# Patient Record
Sex: FEMALE | Race: WHITE | NOT HISPANIC OR LATINO | URBAN - METROPOLITAN AREA
[De-identification: names, ages, dates, MRNs, and addresses within clinical notes are randomized per-mention and may not be internally consistent; named-entity substitution may affect disease eponyms.]

---

## 2017-11-30 ENCOUNTER — INPATIENT (INPATIENT)
Facility: HOSPITAL | Age: 42
LOS: 2 days | Discharge: HOME | End: 2017-12-03
Attending: OBSTETRICS & GYNECOLOGY | Admitting: OBSTETRICS & GYNECOLOGY
Payer: COMMERCIAL

## 2017-11-30 ENCOUNTER — TRANSCRIPTION ENCOUNTER (OUTPATIENT)
Age: 42
End: 2017-11-30

## 2017-11-30 ENCOUNTER — RESULT REVIEW (OUTPATIENT)
Age: 42
End: 2017-11-30

## 2017-11-30 VITALS — WEIGHT: 147.71 LBS | HEIGHT: 58 IN

## 2017-11-30 DIAGNOSIS — Z3A.00 WEEKS OF GESTATION OF PREGNANCY NOT SPECIFIED: ICD-10-CM

## 2017-11-30 DIAGNOSIS — O26.899 OTHER SPECIFIED PREGNANCY RELATED CONDITIONS, UNSPECIFIED TRIMESTER: ICD-10-CM

## 2017-11-30 DIAGNOSIS — Z34.80 ENCOUNTER FOR SUPERVISION OF OTHER NORMAL PREGNANCY, UNSPECIFIED TRIMESTER: ICD-10-CM

## 2017-11-30 LAB
BASOPHILS # BLD AUTO: 0 K/UL — SIGNIFICANT CHANGE UP (ref 0–0.2)
BASOPHILS NFR BLD AUTO: 0.3 % — SIGNIFICANT CHANGE UP (ref 0–2)
BLD GP AB SCN SERPL QL: NEGATIVE — SIGNIFICANT CHANGE UP
EOSINOPHIL # BLD AUTO: 0 K/UL — SIGNIFICANT CHANGE UP (ref 0–0.5)
EOSINOPHIL NFR BLD AUTO: 0.4 % — SIGNIFICANT CHANGE UP (ref 0–6)
HCT VFR BLD CALC: 35.4 % — SIGNIFICANT CHANGE UP (ref 34.5–45)
HGB BLD-MCNC: 12.4 G/DL — SIGNIFICANT CHANGE UP (ref 11.5–15.5)
LYMPHOCYTES # BLD AUTO: 1.6 K/UL — SIGNIFICANT CHANGE UP (ref 1–3.3)
LYMPHOCYTES # BLD AUTO: 16.9 % — SIGNIFICANT CHANGE UP (ref 13–44)
MCHC RBC-ENTMCNC: 30.1 PG — SIGNIFICANT CHANGE UP (ref 27–34)
MCHC RBC-ENTMCNC: 34.9 GM/DL — SIGNIFICANT CHANGE UP (ref 32–36)
MCV RBC AUTO: 86.1 FL — SIGNIFICANT CHANGE UP (ref 80–100)
MONOCYTES # BLD AUTO: 0.6 K/UL — SIGNIFICANT CHANGE UP (ref 0–0.9)
MONOCYTES NFR BLD AUTO: 6.1 % — SIGNIFICANT CHANGE UP (ref 2–14)
NEUTROPHILS # BLD AUTO: 7.4 K/UL — SIGNIFICANT CHANGE UP (ref 1.8–7.4)
NEUTROPHILS NFR BLD AUTO: 76.3 % — SIGNIFICANT CHANGE UP (ref 43–77)
PLATELET # BLD AUTO: 161 K/UL — SIGNIFICANT CHANGE UP (ref 150–400)
RBC # BLD: 4.11 M/UL — SIGNIFICANT CHANGE UP (ref 3.8–5.2)
RBC # FLD: 12.3 % — SIGNIFICANT CHANGE UP (ref 10.3–14.5)
RH IG SCN BLD-IMP: POSITIVE — SIGNIFICANT CHANGE UP
WBC # BLD: 9.7 K/UL — SIGNIFICANT CHANGE UP (ref 3.8–10.5)
WBC # FLD AUTO: 9.7 K/UL — SIGNIFICANT CHANGE UP (ref 3.8–10.5)

## 2017-11-30 PROCEDURE — 88302 TISSUE EXAM BY PATHOLOGIST: CPT | Mod: 26

## 2017-11-30 RX ORDER — NALOXONE HYDROCHLORIDE 4 MG/.1ML
0.1 SPRAY NASAL
Qty: 0 | Refills: 0 | Status: DISCONTINUED | OUTPATIENT
Start: 2017-11-30 | End: 2017-12-03

## 2017-11-30 RX ORDER — ONDANSETRON 8 MG/1
4 TABLET, FILM COATED ORAL EVERY 6 HOURS
Qty: 0 | Refills: 0 | Status: DISCONTINUED | OUTPATIENT
Start: 2017-11-30 | End: 2017-12-03

## 2017-11-30 RX ORDER — OXYTOCIN 10 UNIT/ML
333.33 VIAL (ML) INJECTION
Qty: 20 | Refills: 0 | Status: COMPLETED | OUTPATIENT
Start: 2017-11-30 | End: 2017-11-30

## 2017-11-30 RX ORDER — LANOLIN
1 OINTMENT (GRAM) TOPICAL
Qty: 0 | Refills: 0 | Status: DISCONTINUED | OUTPATIENT
Start: 2017-12-01 | End: 2017-12-03

## 2017-11-30 RX ORDER — SIMETHICONE 80 MG/1
80 TABLET, CHEWABLE ORAL EVERY 4 HOURS
Qty: 0 | Refills: 0 | Status: DISCONTINUED | OUTPATIENT
Start: 2017-12-01 | End: 2017-12-03

## 2017-11-30 RX ORDER — SODIUM CHLORIDE 9 MG/ML
1000 INJECTION, SOLUTION INTRAVENOUS
Qty: 0 | Refills: 0 | Status: DISCONTINUED | OUTPATIENT
Start: 2017-11-30 | End: 2017-12-03

## 2017-11-30 RX ORDER — OXYTOCIN 10 UNIT/ML
41.67 VIAL (ML) INJECTION
Qty: 20 | Refills: 0 | Status: DISCONTINUED | OUTPATIENT
Start: 2017-12-01 | End: 2017-12-03

## 2017-11-30 RX ORDER — HEPARIN SODIUM 5000 [USP'U]/ML
5000 INJECTION INTRAVENOUS; SUBCUTANEOUS EVERY 12 HOURS
Qty: 0 | Refills: 0 | Status: DISCONTINUED | OUTPATIENT
Start: 2017-12-01 | End: 2017-12-03

## 2017-11-30 RX ORDER — ACETAMINOPHEN 500 MG
975 TABLET ORAL EVERY 6 HOURS
Qty: 0 | Refills: 0 | Status: DISCONTINUED | OUTPATIENT
Start: 2017-12-01 | End: 2017-12-03

## 2017-11-30 RX ORDER — SODIUM CHLORIDE 9 MG/ML
1000 INJECTION, SOLUTION INTRAVENOUS
Qty: 0 | Refills: 0 | Status: DISCONTINUED | OUTPATIENT
Start: 2017-11-30 | End: 2017-11-30

## 2017-11-30 RX ORDER — METOCLOPRAMIDE HCL 10 MG
10 TABLET ORAL ONCE
Qty: 0 | Refills: 0 | Status: DISCONTINUED | OUTPATIENT
Start: 2017-11-30 | End: 2017-12-03

## 2017-11-30 RX ORDER — OXYTOCIN 10 UNIT/ML
41.67 VIAL (ML) INJECTION
Qty: 20 | Refills: 0 | Status: DISCONTINUED | OUTPATIENT
Start: 2017-11-30 | End: 2017-12-03

## 2017-11-30 RX ORDER — SODIUM CHLORIDE 9 MG/ML
1000 INJECTION, SOLUTION INTRAVENOUS ONCE
Qty: 0 | Refills: 0 | Status: COMPLETED | OUTPATIENT
Start: 2017-11-30 | End: 2017-11-30

## 2017-11-30 RX ORDER — IBUPROFEN 200 MG
600 TABLET ORAL EVERY 6 HOURS
Qty: 0 | Refills: 0 | Status: COMPLETED | OUTPATIENT
Start: 2017-12-01 | End: 2018-10-30

## 2017-11-30 RX ORDER — FAMOTIDINE 10 MG/ML
20 INJECTION INTRAVENOUS ONCE
Qty: 0 | Refills: 0 | Status: COMPLETED | OUTPATIENT
Start: 2017-11-30 | End: 2017-11-30

## 2017-11-30 RX ORDER — DOCUSATE SODIUM 100 MG
100 CAPSULE ORAL
Qty: 0 | Refills: 0 | Status: DISCONTINUED | OUTPATIENT
Start: 2017-12-01 | End: 2017-12-03

## 2017-11-30 RX ORDER — TETANUS TOXOID, REDUCED DIPHTHERIA TOXOID AND ACELLULAR PERTUSSIS VACCINE, ADSORBED 5; 2.5; 8; 8; 2.5 [IU]/.5ML; [IU]/.5ML; UG/.5ML; UG/.5ML; UG/.5ML
0.5 SUSPENSION INTRAMUSCULAR ONCE
Qty: 0 | Refills: 0 | Status: DISCONTINUED | OUTPATIENT
Start: 2017-12-01 | End: 2017-12-03

## 2017-11-30 RX ORDER — DIPHENHYDRAMINE HCL 50 MG
25 CAPSULE ORAL EVERY 6 HOURS
Qty: 0 | Refills: 0 | Status: DISCONTINUED | OUTPATIENT
Start: 2017-12-01 | End: 2017-12-03

## 2017-11-30 RX ORDER — FERROUS SULFATE 325(65) MG
325 TABLET ORAL DAILY
Qty: 0 | Refills: 0 | Status: DISCONTINUED | OUTPATIENT
Start: 2017-12-01 | End: 2017-12-03

## 2017-11-30 RX ORDER — DEXAMETHASONE 0.5 MG/5ML
4 ELIXIR ORAL EVERY 6 HOURS
Qty: 0 | Refills: 0 | Status: DISCONTINUED | OUTPATIENT
Start: 2017-11-30 | End: 2017-12-03

## 2017-11-30 RX ORDER — CITRIC ACID/SODIUM CITRATE 300-500 MG
30 SOLUTION, ORAL ORAL ONCE
Qty: 0 | Refills: 0 | Status: COMPLETED | OUTPATIENT
Start: 2017-11-30 | End: 2017-11-30

## 2017-11-30 RX ORDER — GLYCERIN ADULT
1 SUPPOSITORY, RECTAL RECTAL AT BEDTIME
Qty: 0 | Refills: 0 | Status: DISCONTINUED | OUTPATIENT
Start: 2017-12-01 | End: 2017-12-03

## 2017-11-30 RX ORDER — OXYCODONE HYDROCHLORIDE 5 MG/1
5 TABLET ORAL
Qty: 0 | Refills: 0 | Status: COMPLETED | OUTPATIENT
Start: 2017-12-01 | End: 2017-12-08

## 2017-11-30 RX ORDER — KETOROLAC TROMETHAMINE 30 MG/ML
30 SYRINGE (ML) INJECTION EVERY 6 HOURS
Qty: 0 | Refills: 0 | Status: DISCONTINUED | OUTPATIENT
Start: 2017-12-01 | End: 2017-12-03

## 2017-11-30 RX ORDER — OXYCODONE HYDROCHLORIDE 5 MG/1
5 TABLET ORAL EVERY 4 HOURS
Qty: 0 | Refills: 0 | Status: COMPLETED | OUTPATIENT
Start: 2017-12-02 | End: 2017-12-09

## 2017-11-30 RX ADMIN — Medication 30 MILLILITER(S): at 19:10

## 2017-11-30 RX ADMIN — Medication 1000 MILLIUNIT(S)/MIN: at 21:24

## 2017-11-30 RX ADMIN — Medication 125 MILLIUNIT(S)/MIN: at 23:20

## 2017-11-30 RX ADMIN — SODIUM CHLORIDE 125 MILLILITER(S): 9 INJECTION, SOLUTION INTRAVENOUS at 17:20

## 2017-11-30 RX ADMIN — SODIUM CHLORIDE 2000 MILLILITER(S): 9 INJECTION, SOLUTION INTRAVENOUS at 16:40

## 2017-11-30 RX ADMIN — FAMOTIDINE 20 MILLIGRAM(S): 10 INJECTION INTRAVENOUS at 19:10

## 2017-11-30 NOTE — PATIENT PROFILE OB - TOBACCO USE
Problem: Communication  Goal: The ability to communicate needs accurately and effectively will improve  Outcome: PROGRESSING AS EXPECTED         Never smoker

## 2017-11-30 NOTE — PATIENT PROFILE OB - NS PRO REASONS FOR NOT BREASTFEEDING
The mother chose not to exclusively breastfeed upon admission./Maternal medical condition,  will not be

## 2017-12-01 LAB
HCT VFR BLD CALC: 30.9 % — LOW (ref 34.5–45)
HGB BLD-MCNC: 10.9 G/DL — LOW (ref 11.5–15.5)
MCHC RBC-ENTMCNC: 30.5 PG — SIGNIFICANT CHANGE UP (ref 27–34)
MCHC RBC-ENTMCNC: 35.3 GM/DL — SIGNIFICANT CHANGE UP (ref 32–36)
MCV RBC AUTO: 86.4 FL — SIGNIFICANT CHANGE UP (ref 80–100)
PLATELET # BLD AUTO: 154 K/UL — SIGNIFICANT CHANGE UP (ref 150–400)
RBC # BLD: 3.58 M/UL — LOW (ref 3.8–5.2)
RBC # FLD: 12.2 % — SIGNIFICANT CHANGE UP (ref 10.3–14.5)
T PALLIDUM AB TITR SER: NEGATIVE — SIGNIFICANT CHANGE UP
WBC # BLD: 10.6 K/UL — HIGH (ref 3.8–10.5)
WBC # FLD AUTO: 10.6 K/UL — HIGH (ref 3.8–10.5)

## 2017-12-01 RX ORDER — SERTRALINE 25 MG/1
100 TABLET, FILM COATED ORAL DAILY
Qty: 0 | Refills: 0 | Status: DISCONTINUED | OUTPATIENT
Start: 2017-12-01 | End: 2017-12-03

## 2017-12-01 RX ADMIN — SIMETHICONE 80 MILLIGRAM(S): 80 TABLET, CHEWABLE ORAL at 20:40

## 2017-12-01 RX ADMIN — Medication 30 MILLIGRAM(S): at 06:00

## 2017-12-01 RX ADMIN — Medication 975 MILLIGRAM(S): at 05:30

## 2017-12-01 RX ADMIN — Medication 1 TABLET(S): at 11:21

## 2017-12-01 RX ADMIN — SIMETHICONE 80 MILLIGRAM(S): 80 TABLET, CHEWABLE ORAL at 14:06

## 2017-12-01 RX ADMIN — Medication 325 MILLIGRAM(S): at 11:22

## 2017-12-01 RX ADMIN — Medication 30 MILLIGRAM(S): at 12:00

## 2017-12-01 RX ADMIN — HEPARIN SODIUM 5000 UNIT(S): 5000 INJECTION INTRAVENOUS; SUBCUTANEOUS at 02:13

## 2017-12-01 RX ADMIN — HEPARIN SODIUM 5000 UNIT(S): 5000 INJECTION INTRAVENOUS; SUBCUTANEOUS at 17:15

## 2017-12-01 RX ADMIN — Medication 30 MILLIGRAM(S): at 17:14

## 2017-12-01 RX ADMIN — Medication 30 MILLIGRAM(S): at 11:21

## 2017-12-01 RX ADMIN — Medication 25 MILLIGRAM(S): at 08:25

## 2017-12-01 RX ADMIN — SIMETHICONE 80 MILLIGRAM(S): 80 TABLET, CHEWABLE ORAL at 05:31

## 2017-12-01 RX ADMIN — Medication 100 MILLIGRAM(S): at 05:31

## 2017-12-01 RX ADMIN — SERTRALINE 100 MILLIGRAM(S): 25 TABLET, FILM COATED ORAL at 06:53

## 2017-12-01 RX ADMIN — Medication 30 MILLIGRAM(S): at 17:49

## 2017-12-01 RX ADMIN — Medication 25 MILLIGRAM(S): at 02:13

## 2017-12-01 RX ADMIN — Medication 30 MILLIGRAM(S): at 05:31

## 2017-12-01 RX ADMIN — SODIUM CHLORIDE 125 MILLILITER(S): 9 INJECTION, SOLUTION INTRAVENOUS at 13:53

## 2017-12-01 RX ADMIN — Medication 975 MILLIGRAM(S): at 22:30

## 2017-12-01 RX ADMIN — Medication 975 MILLIGRAM(S): at 23:00

## 2017-12-01 RX ADMIN — Medication 975 MILLIGRAM(S): at 06:00

## 2017-12-01 NOTE — PROGRESS NOTE ADULT - SUBJECTIVE AND OBJECTIVE BOX
Postpartum Note,  Section  She is a  42y woman who is now post-operative day: 1    Subjective:  The patient feels well.  She is ambulating.   She is tolerating water overnight.  She denies nausea and vomiting.  She is voiding.  Her pain is controlled.  She reports normal postpartum bleeding.    Physical exam:    Vital Signs Last 24 Hrs  T(C): 36.7 (01 Dec 2017 05:00), Max: 36.8 (2017 23:10)  T(F): 98.1 (01 Dec 2017 05:00), Max: 98.2 (2017 23:10)  HR: 51 (01 Dec 2017 05:00) (51 - 76)  BP: 128/75 (01 Dec 2017 05:00) (114/66 - 150/72)  BP(mean): 101 (2017 23:10) (74 - 104)  RR: 18 (01 Dec 2017 05:00) (15 - 18)  SpO2: 99% (01 Dec 2017 05:00) (96% - 100%)    Gen: NAD  Breast: Soft, nontender, not engorged.  Abdomen: Soft, nontender, no distension , firm uterine fundus at umbilicus.  Incision: Dressing removed - clean, dry, and intact with steri strips  Ext: No calf tenderness    LABS:                        12.4   9.7   )-----------( 161      ( 2017 16:27 )             35.4     ABO Interpretation: B ( @ 16:27)  Rh Interpretation: Positive ( @ 16:27)  Antibody Screen: Negative ( @ 16:27)        Allergies    No Known Allergies    Intolerances      MEDICATIONS  (STANDING):  acetaminophen   Tablet. 975 milliGRAM(s) Oral every 6 hours  diphtheria/tetanus/pertussis (acellular) Vaccine (ADAcel) 0.5 milliLiter(s) IntraMuscular once  fentaNYL (3 MICROgram(s)/mL) + BUpivacaine 0.01% in 0.9% Sodium Chloride PCEA 250 milliLiter(s) Epidural PCA Continuous  ferrous    sulfate 325 milliGRAM(s) Oral daily  heparin  Injectable 5000 Unit(s) SubCutaneous every 12 hours  ibuprofen  Tablet 600 milliGRAM(s) Oral every 6 hours  ketorolac   Injectable 30 milliGRAM(s) IV Push every 6 hours  lactated ringers. 1000 milliLiter(s) (125 mL/Hr) IV Continuous <Continuous>  oxyCODONE    IR 5 milliGRAM(s) Oral every 3 hours  oxytocin Infusion 41.667 milliUNIT(s)/Min (125 mL/Hr) IV Continuous <Continuous>  oxytocin Infusion 41.667 milliUNIT(s)/Min (125 mL/Hr) IV Continuous <Continuous>  prenatal multivitamin 1 Tablet(s) Oral daily  sertraline 100 milliGRAM(s) Oral daily    MEDICATIONS  (PRN):  dexamethasone  Injectable 4 milliGRAM(s) IV Push every 6 hours PRN Nausea, IF ondansetron is ineffective after 30 - 60 minutes  diphenhydrAMINE   Capsule 25 milliGRAM(s) Oral every 6 hours PRN Itching  docusate sodium 100 milliGRAM(s) Oral two times a day PRN Stool Softening  fentaNYL (3 MICROgram(s)/mL) + BUpivacaine 0.01% in 0.9% Sodium Chloride PCEA Rescue Clinician Bolus 5 milliLiter(s) Epidural every 15 minutes PRN Severe Pain (7 - 10)  glycerin Suppository - Adult 1 Suppository(s) Rectal at bedtime PRN Constipation  lanolin Ointment 1 Application(s) Topical every 3 hours PRN Sore Nipples  metoclopramide Injectable 10 milliGRAM(s) IV Push once PRN Nausea and/or Vomiting  naloxone Injectable 0.1 milliGRAM(s) IV Push every 3 minutes PRN For ANY of the following changes in patient status:  A. RR LESS THAN 10 breaths per minute, B. Oxygen saturation LESS THAN 90%, C. Sedation score of 6  ondansetron Injectable 4 milliGRAM(s) IV Push every 6 hours PRN Nausea  simethicone 80 milliGRAM(s) Chew every 4 hours PRN Gas        Assessment and Plan  POD #1 s/p  section  Continue current pain medication.   Encourage ambulation.  DVT ppx: HSQ, venodynes, OOB  Pt stable, tolerating regular diet, venegas in place w/adequate UOP.    michael [email or web address] Postpartum Note,  Section  She is a  42y woman who is now post-operative day: 1    Subjective:  The patient feels well.  She is ambulating.   She is tolerating water overnight.  She denies nausea and vomiting.  Her pain is controlled.  She reports normal postpartum bleeding.    Physical exam:    Vital Signs Last 24 Hrs  T(C): 36.7 (01 Dec 2017 05:00), Max: 36.8 (2017 23:10)  T(F): 98.1 (01 Dec 2017 05:00), Max: 98.2 (2017 23:10)  HR: 51 (01 Dec 2017 05:00) (51 - 76)  BP: 128/75 (01 Dec 2017 05:00) (114/66 - 150/72)  BP(mean): 101 (2017 23:10) (74 - 104)  RR: 18 (01 Dec 2017 05:00) (15 - 18)  SpO2: 99% (01 Dec 2017 05:00) (96% - 100%)    Gen: NAD  Breast: Soft, nontender, not engorged.  Abdomen: Soft, nontender, no distension , firm uterine fundus at umbilicus.  Incision: Dressing removed - clean, dry, and intact with steri strips  Ext: No calf tenderness    LABS:                        12.4   9.7   )-----------( 161      ( 2017 16:27 )             35.4     ABO Interpretation: B (30 @ 16:27)  Rh Interpretation: Positive ( @ 16:27)  Antibody Screen: Negative ( @ 16:27)        Allergies    No Known Allergies    Intolerances      MEDICATIONS  (STANDING):  acetaminophen   Tablet. 975 milliGRAM(s) Oral every 6 hours  diphtheria/tetanus/pertussis (acellular) Vaccine (ADAcel) 0.5 milliLiter(s) IntraMuscular once  fentaNYL (3 MICROgram(s)/mL) + BUpivacaine 0.01% in 0.9% Sodium Chloride PCEA 250 milliLiter(s) Epidural PCA Continuous  ferrous    sulfate 325 milliGRAM(s) Oral daily  heparin  Injectable 5000 Unit(s) SubCutaneous every 12 hours  ibuprofen  Tablet 600 milliGRAM(s) Oral every 6 hours  ketorolac   Injectable 30 milliGRAM(s) IV Push every 6 hours  lactated ringers. 1000 milliLiter(s) (125 mL/Hr) IV Continuous <Continuous>  oxyCODONE    IR 5 milliGRAM(s) Oral every 3 hours  oxytocin Infusion 41.667 milliUNIT(s)/Min (125 mL/Hr) IV Continuous <Continuous>  oxytocin Infusion 41.667 milliUNIT(s)/Min (125 mL/Hr) IV Continuous <Continuous>  prenatal multivitamin 1 Tablet(s) Oral daily  sertraline 100 milliGRAM(s) Oral daily    MEDICATIONS  (PRN):  dexamethasone  Injectable 4 milliGRAM(s) IV Push every 6 hours PRN Nausea, IF ondansetron is ineffective after 30 - 60 minutes  diphenhydrAMINE   Capsule 25 milliGRAM(s) Oral every 6 hours PRN Itching  docusate sodium 100 milliGRAM(s) Oral two times a day PRN Stool Softening  fentaNYL (3 MICROgram(s)/mL) + BUpivacaine 0.01% in 0.9% Sodium Chloride PCEA Rescue Clinician Bolus 5 milliLiter(s) Epidural every 15 minutes PRN Severe Pain (7 - 10)  glycerin Suppository - Adult 1 Suppository(s) Rectal at bedtime PRN Constipation  lanolin Ointment 1 Application(s) Topical every 3 hours PRN Sore Nipples  metoclopramide Injectable 10 milliGRAM(s) IV Push once PRN Nausea and/or Vomiting  naloxone Injectable 0.1 milliGRAM(s) IV Push every 3 minutes PRN For ANY of the following changes in patient status:  A. RR LESS THAN 10 breaths per minute, B. Oxygen saturation LESS THAN 90%, C. Sedation score of 6  ondansetron Injectable 4 milliGRAM(s) IV Push every 6 hours PRN Nausea  simethicone 80 milliGRAM(s) Chew every 4 hours PRN Gas        Assessment and Plan  POD #1 s/p  section  Continue current pain medication.   Encourage ambulation.  DVT ppx: HSQ, venodynes, OOB  Pt stable, tolerating regular diet, venegas in place w/adequate UOP.    Renya, [email or web address]

## 2017-12-01 NOTE — PROVIDER CONTACT NOTE (OTHER) - SITUATION
Rec pt from L&D at 00:00 - pt pitcon was being bolus and bp was 147/85. - rec report from L&D nurse Lorie

## 2017-12-02 ENCOUNTER — TRANSCRIPTION ENCOUNTER (OUTPATIENT)
Age: 42
End: 2017-12-02

## 2017-12-02 RX ORDER — IBUPROFEN 200 MG
600 TABLET ORAL EVERY 6 HOURS
Qty: 0 | Refills: 0 | Status: DISCONTINUED | OUTPATIENT
Start: 2017-12-02 | End: 2017-12-03

## 2017-12-02 RX ORDER — FAMOTIDINE 10 MG/ML
20 INJECTION INTRAVENOUS DAILY
Qty: 0 | Refills: 0 | Status: DISCONTINUED | OUTPATIENT
Start: 2017-12-02 | End: 2017-12-03

## 2017-12-02 RX ORDER — OXYCODONE HYDROCHLORIDE 5 MG/1
5 TABLET ORAL EVERY 4 HOURS
Qty: 0 | Refills: 0 | Status: DISCONTINUED | OUTPATIENT
Start: 2017-12-02 | End: 2017-12-03

## 2017-12-02 RX ORDER — OXYCODONE HYDROCHLORIDE 5 MG/1
5 TABLET ORAL
Qty: 0 | Refills: 0 | Status: DISCONTINUED | OUTPATIENT
Start: 2017-12-02 | End: 2017-12-03

## 2017-12-02 RX ADMIN — OXYCODONE HYDROCHLORIDE 5 MILLIGRAM(S): 5 TABLET ORAL at 08:34

## 2017-12-02 RX ADMIN — Medication 600 MILLIGRAM(S): at 01:40

## 2017-12-02 RX ADMIN — Medication 975 MILLIGRAM(S): at 18:33

## 2017-12-02 RX ADMIN — Medication 100 MILLIGRAM(S): at 04:54

## 2017-12-02 RX ADMIN — Medication 975 MILLIGRAM(S): at 19:05

## 2017-12-02 RX ADMIN — Medication 600 MILLIGRAM(S): at 13:28

## 2017-12-02 RX ADMIN — OXYCODONE HYDROCHLORIDE 5 MILLIGRAM(S): 5 TABLET ORAL at 08:04

## 2017-12-02 RX ADMIN — Medication 1 TABLET(S): at 13:21

## 2017-12-02 RX ADMIN — Medication 325 MILLIGRAM(S): at 13:20

## 2017-12-02 RX ADMIN — FAMOTIDINE 20 MILLIGRAM(S): 10 INJECTION INTRAVENOUS at 13:20

## 2017-12-02 RX ADMIN — Medication 600 MILLIGRAM(S): at 01:09

## 2017-12-02 RX ADMIN — OXYCODONE HYDROCHLORIDE 5 MILLIGRAM(S): 5 TABLET ORAL at 18:33

## 2017-12-02 RX ADMIN — Medication 30 MILLIGRAM(S): at 19:05

## 2017-12-02 RX ADMIN — Medication 600 MILLIGRAM(S): at 06:59

## 2017-12-02 RX ADMIN — HEPARIN SODIUM 5000 UNIT(S): 5000 INJECTION INTRAVENOUS; SUBCUTANEOUS at 06:59

## 2017-12-02 RX ADMIN — Medication 975 MILLIGRAM(S): at 04:54

## 2017-12-02 RX ADMIN — SIMETHICONE 80 MILLIGRAM(S): 80 TABLET, CHEWABLE ORAL at 08:05

## 2017-12-02 RX ADMIN — SERTRALINE 100 MILLIGRAM(S): 25 TABLET, FILM COATED ORAL at 06:59

## 2017-12-02 RX ADMIN — Medication 600 MILLIGRAM(S): at 13:48

## 2017-12-02 RX ADMIN — Medication 975 MILLIGRAM(S): at 13:18

## 2017-12-02 RX ADMIN — OXYCODONE HYDROCHLORIDE 5 MILLIGRAM(S): 5 TABLET ORAL at 13:48

## 2017-12-02 RX ADMIN — OXYCODONE HYDROCHLORIDE 5 MILLIGRAM(S): 5 TABLET ORAL at 19:05

## 2017-12-02 RX ADMIN — HEPARIN SODIUM 5000 UNIT(S): 5000 INJECTION INTRAVENOUS; SUBCUTANEOUS at 18:34

## 2017-12-02 RX ADMIN — Medication 30 MILLIGRAM(S): at 18:37

## 2017-12-02 RX ADMIN — OXYCODONE HYDROCHLORIDE 5 MILLIGRAM(S): 5 TABLET ORAL at 13:21

## 2017-12-02 RX ADMIN — Medication 975 MILLIGRAM(S): at 13:48

## 2017-12-02 RX ADMIN — Medication 975 MILLIGRAM(S): at 05:30

## 2017-12-02 NOTE — PROGRESS NOTE ADULT - SUBJECTIVE AND OBJECTIVE BOX
Patient seen and examined at bedside, no acute overnight events. No acute complaints, pain well controlled. Patient is ambulating, voiding spontaneously, passing flatus, and tolerating regular diet. Pt is breast feeding her baby.    Vital Signs Last 24 Hours  T(C): 36.5 (12-02-17 @ 06:54), Max: 36.8 (12-01-17 @ 13:00)  HR: 55 (12-02-17 @ 06:54) (52 - 86)  BP: 137/85 (12-02-17 @ 06:54) (107/68 - 137/85)  RR: 18 (12-02-17 @ 06:54) (17 - 18)  SpO2: 97% (12-02-17 @ 01:15) (97% - 100%)    Physical Exam:  General: NAD  Abdomen: Soft, non-tender, non-distended, fundus firm  Incision: Pfannenstiel incision CDI, subcuticular suture closure  Pelvic: Lochia wnl    Labs:    Blood Type: B Positive  Antibody Screen: Negative  RPR: Negative               10.9   10.6  )-----------( 154      ( 12-01 @ 07:12 )             30.9                12.4   9.7   )-----------( 161      ( 11-30 @ 16:27 )             35.4         MEDICATIONS  (STANDING):  acetaminophen   Tablet. 975 milliGRAM(s) Oral every 6 hours  diphtheria/tetanus/pertussis (acellular) Vaccine (ADAcel) 0.5 milliLiter(s) IntraMuscular once  fentaNYL (3 MICROgram(s)/mL) + BUpivacaine 0.01% in 0.9% Sodium Chloride PCEA 250 milliLiter(s) Epidural PCA Continuous  ferrous    sulfate 325 milliGRAM(s) Oral daily  heparin  Injectable 5000 Unit(s) SubCutaneous every 12 hours  ibuprofen  Tablet 600 milliGRAM(s) Oral every 6 hours  ketorolac   Injectable 30 milliGRAM(s) IV Push every 6 hours  lactated ringers. 1000 milliLiter(s) (125 mL/Hr) IV Continuous <Continuous>  oxyCODONE    IR 5 milliGRAM(s) Oral every 3 hours  oxytocin Infusion 41.667 milliUNIT(s)/Min (125 mL/Hr) IV Continuous <Continuous>  oxytocin Infusion 41.667 milliUNIT(s)/Min (125 mL/Hr) IV Continuous <Continuous>  prenatal multivitamin 1 Tablet(s) Oral daily  sertraline 100 milliGRAM(s) Oral daily    MEDICATIONS  (PRN):  dexamethasone  Injectable 4 milliGRAM(s) IV Push every 6 hours PRN Nausea, IF ondansetron is ineffective after 30 - 60 minutes  diphenhydrAMINE   Capsule 25 milliGRAM(s) Oral every 6 hours PRN Itching  docusate sodium 100 milliGRAM(s) Oral two times a day PRN Stool Softening  fentaNYL (3 MICROgram(s)/mL) + BUpivacaine 0.01% in 0.9% Sodium Chloride PCEA Rescue Clinician Bolus 5 milliLiter(s) Epidural every 15 minutes PRN Severe Pain (7 - 10)  glycerin Suppository - Adult 1 Suppository(s) Rectal at bedtime PRN Constipation  lanolin Ointment 1 Application(s) Topical every 3 hours PRN Sore Nipples  metoclopramide Injectable 10 milliGRAM(s) IV Push once PRN Nausea and/or Vomiting  naloxone Injectable 0.1 milliGRAM(s) IV Push every 3 minutes PRN For ANY of the following changes in patient status:  A. RR LESS THAN 10 breaths per minute, B. Oxygen saturation LESS THAN 90%, C. Sedation score of 6  ondansetron Injectable 4 milliGRAM(s) IV Push every 6 hours PRN Nausea  oxyCODONE    IR 5 milliGRAM(s) Oral every 4 hours PRN Severe Pain (7 - 10)  simethicone 80 milliGRAM(s) Chew every 4 hours PRN Gas

## 2017-12-02 NOTE — DISCHARGE NOTE OB - CARE PROVIDER_API CALL
Homero Lawton (MD), Obstetrics and Gynecology  [address] [place] [address] [address]  [place] [address]  Phone: [phone]  Fax: [phone]

## 2017-12-02 NOTE — DISCHARGE NOTE OB - MATERIALS PROVIDED
Immunization Record/[facility] Screening Program/Breastfeeding Log/Breastfeeding Guide and Packet/Guide to Postpartum Care/Discharge Medication Information for Patients and Families Pocket Guide

## 2017-12-02 NOTE — DISCHARGE NOTE OB - PATIENT PORTAL LINK FT
“You can access the Follow[employer] Patient Portal, offered by [facility], by registering with the following website: [email or web address]”

## 2017-12-02 NOTE — DISCHARGE NOTE OB - CARE PLAN
Principal Discharge DX:	 delivery delivered  Goal:	home  Instructions for follow-up, activity and diet:	per pmd

## 2017-12-02 NOTE — PROGRESS NOTE ADULT - SUBJECTIVE AND OBJECTIVE BOX
Day _1_ of Anesthesia Pain Management Service    SUBJECTIVE:  Pain Scale Score	At rest: ___ 	With Activity: ___ 	[x  ] Refer to charted pain scores    THERAPY:  [ ] Epidural Bupivacaine 0.0625% and Hydromorphone 10 micrograms/mL  [ ] Epidural Ropivacaine 0.2% plain   [x ] Epidural Bupivacaine 0.01 % and Fentanyl 3 micrograms/mL  (OB)    Demand dose _3ml__ lockout _15__ (minutes) Continuous Rate _10__       MEDICATIONS  (STANDING):  acetaminophen   Tablet. 975 milliGRAM(s) Oral every 6 hours  diphtheria/tetanus/pertussis (acellular) Vaccine (ADAcel) 0.5 milliLiter(s) IntraMuscular once  famotidine    Tablet 20 milliGRAM(s) Oral daily  fentaNYL (3 MICROgram(s)/mL) + BUpivacaine 0.01% in 0.9% Sodium Chloride PCEA 250 milliLiter(s) Epidural PCA Continuous  ferrous    sulfate 325 milliGRAM(s) Oral daily  heparin  Injectable 5000 Unit(s) SubCutaneous every 12 hours  ibuprofen  Tablet 600 milliGRAM(s) Oral every 6 hours  ketorolac   Injectable 30 milliGRAM(s) IV Push every 6 hours  lactated ringers. 1000 milliLiter(s) (125 mL/Hr) IV Continuous <Continuous>  oxyCODONE    IR 5 milliGRAM(s) Oral every 3 hours  oxytocin Infusion 41.667 milliUNIT(s)/Min (125 mL/Hr) IV Continuous <Continuous>  oxytocin Infusion 41.667 milliUNIT(s)/Min (125 mL/Hr) IV Continuous <Continuous>  prenatal multivitamin 1 Tablet(s) Oral daily  sertraline 100 milliGRAM(s) Oral daily    MEDICATIONS  (PRN):  dexamethasone  Injectable 4 milliGRAM(s) IV Push every 6 hours PRN Nausea, IF ondansetron is ineffective after 30 - 60 minutes  diphenhydrAMINE   Capsule 25 milliGRAM(s) Oral every 6 hours PRN Itching  docusate sodium 100 milliGRAM(s) Oral two times a day PRN Stool Softening  fentaNYL (3 MICROgram(s)/mL) + BUpivacaine 0.01% in 0.9% Sodium Chloride PCEA Rescue Clinician Bolus 5 milliLiter(s) Epidural every 15 minutes PRN Severe Pain (7 - 10)  glycerin Suppository - Adult 1 Suppository(s) Rectal at bedtime PRN Constipation  lanolin Ointment 1 Application(s) Topical every 3 hours PRN Sore Nipples  metoclopramide Injectable 10 milliGRAM(s) IV Push once PRN Nausea and/or Vomiting  naloxone Injectable 0.1 milliGRAM(s) IV Push every 3 minutes PRN For ANY of the following changes in patient status:  A. RR LESS THAN 10 breaths per minute, B. Oxygen saturation LESS THAN 90%, C. Sedation score of 6  ondansetron Injectable 4 milliGRAM(s) IV Push every 6 hours PRN Nausea  oxyCODONE    IR 5 milliGRAM(s) Oral every 4 hours PRN Severe Pain (7 - 10)  simethicone 80 milliGRAM(s) Chew every 4 hours PRN Gas      OBJECTIVE:    Assessment of Epidural Catheter Site: 	    [ ] Dressing intact	[ x] Site non-tender	[x ] Site without erythema, discharge, edema  [ ] Epidural tubing and connection checked	[ x[ Gross neurological exam within normal limits  [x ] Catheter removed – tip intact		                          10.9   10.6  )-----------( 154      ( 01 Dec 2017 07:12 )             30.9     Vital Signs Last 24 Hrs  T(C): 36.8 (12-02-17 @ 14:00), Max: 36.8 (12-01-17 @ 21:31)  T(F): 98.2 (12-02-17 @ 14:00), Max: 98.2 (12-01-17 @ 21:31)  HR: 63 (12-02-17 @ 14:00) (52 - 63)  BP: 132/82 (12-02-17 @ 14:00) (110/67 - 137/85)  BP(mean): --  RR: 18 (12-02-17 @ 14:00) (17 - 18)  SpO2: 97% (12-02-17 @ 01:15) (97% - 100%)      Sedation Score:	[x ] Alert	[ ] Drowsy	[ ] Arousable  [ ] Asleep  [ ] Unresponsive    Side Effects:	[ x] None	[ ] Nausea	[ ] Vomiting  [ ] Pruritus  		[ ] Weakness  [ ] Numbness  [ ] Other:    ASSESSMENT/ PLAN:    Therapy to  be:	[ ] Continue   [ x] Discontinued   [x ] Change to prn Analgesics    Documentation and Verification of current medications:  [ X ] Done	[ ] Not done, not eligible, reason:    Comments:I was physically present for the key portions of the evaluation and management service provided. I agree with the above history, physical, and plan which I ahve reviewed and edited where appropriate

## 2017-12-03 VITALS
DIASTOLIC BLOOD PRESSURE: 78 MMHG | RESPIRATION RATE: 18 BRPM | SYSTOLIC BLOOD PRESSURE: 131 MMHG | HEART RATE: 54 BPM | TEMPERATURE: 98 F

## 2017-12-03 LAB
HCT VFR BLD CALC: 33.2 % — LOW (ref 34.5–45)
HGB BLD-MCNC: 10.9 G/DL — LOW (ref 11.5–15.5)
MCHC RBC-ENTMCNC: 27.9 PG — SIGNIFICANT CHANGE UP (ref 27–34)
MCHC RBC-ENTMCNC: 32.8 GM/DL — SIGNIFICANT CHANGE UP (ref 32–36)
MCV RBC AUTO: 85.1 FL — SIGNIFICANT CHANGE UP (ref 80–100)
PLATELET # BLD AUTO: 192 K/UL — SIGNIFICANT CHANGE UP (ref 150–400)
RBC # BLD: 3.9 M/UL — SIGNIFICANT CHANGE UP (ref 3.8–5.2)
RBC # FLD: 13.9 % — SIGNIFICANT CHANGE UP (ref 10.3–14.5)
WBC # BLD: 7.95 K/UL — SIGNIFICANT CHANGE UP (ref 3.8–10.5)
WBC # FLD AUTO: 7.95 K/UL — SIGNIFICANT CHANGE UP (ref 3.8–10.5)

## 2017-12-03 PROCEDURE — 86901 BLOOD TYPING SEROLOGIC RH(D): CPT

## 2017-12-03 PROCEDURE — 86900 BLOOD TYPING SEROLOGIC ABO: CPT

## 2017-12-03 PROCEDURE — G0463: CPT

## 2017-12-03 PROCEDURE — 85027 COMPLETE CBC AUTOMATED: CPT

## 2017-12-03 PROCEDURE — 86850 RBC ANTIBODY SCREEN: CPT

## 2017-12-03 PROCEDURE — 88302 TISSUE EXAM BY PATHOLOGIST: CPT

## 2017-12-03 PROCEDURE — 59050 FETAL MONITOR W/REPORT: CPT

## 2017-12-03 PROCEDURE — 59025 FETAL NON-STRESS TEST: CPT

## 2017-12-03 PROCEDURE — 86780 TREPONEMA PALLIDUM: CPT

## 2017-12-03 RX ORDER — MAGNESIUM HYDROXIDE 400 MG/1
30 TABLET, CHEWABLE ORAL DAILY
Qty: 0 | Refills: 0 | Status: DISCONTINUED | OUTPATIENT
Start: 2017-12-03 | End: 2017-12-03

## 2017-12-03 RX ORDER — OXYCODONE HYDROCHLORIDE 5 MG/1
1 TABLET ORAL
Qty: 20 | Refills: 0 | OUTPATIENT
Start: 2017-12-03

## 2017-12-03 RX ADMIN — Medication 975 MILLIGRAM(S): at 07:05

## 2017-12-03 RX ADMIN — Medication 975 MILLIGRAM(S): at 06:35

## 2017-12-03 RX ADMIN — HEPARIN SODIUM 5000 UNIT(S): 5000 INJECTION INTRAVENOUS; SUBCUTANEOUS at 06:34

## 2017-12-03 RX ADMIN — Medication 975 MILLIGRAM(S): at 11:33

## 2017-12-03 RX ADMIN — Medication 600 MILLIGRAM(S): at 07:05

## 2017-12-03 RX ADMIN — SIMETHICONE 80 MILLIGRAM(S): 80 TABLET, CHEWABLE ORAL at 06:33

## 2017-12-03 RX ADMIN — Medication 325 MILLIGRAM(S): at 11:34

## 2017-12-03 RX ADMIN — MAGNESIUM HYDROXIDE 30 MILLILITER(S): 400 TABLET, CHEWABLE ORAL at 11:02

## 2017-12-03 RX ADMIN — Medication 600 MILLIGRAM(S): at 06:35

## 2017-12-03 RX ADMIN — Medication 975 MILLIGRAM(S): at 01:30

## 2017-12-03 RX ADMIN — Medication 100 MILLIGRAM(S): at 06:33

## 2017-12-03 RX ADMIN — Medication 1 TABLET(S): at 11:35

## 2017-12-03 RX ADMIN — Medication 600 MILLIGRAM(S): at 00:59

## 2017-12-03 RX ADMIN — Medication 600 MILLIGRAM(S): at 01:30

## 2017-12-03 RX ADMIN — Medication 600 MILLIGRAM(S): at 11:34

## 2017-12-03 RX ADMIN — Medication 975 MILLIGRAM(S): at 00:59

## 2017-12-03 RX ADMIN — OXYCODONE HYDROCHLORIDE 5 MILLIGRAM(S): 5 TABLET ORAL at 11:34

## 2017-12-03 RX ADMIN — SERTRALINE 100 MILLIGRAM(S): 25 TABLET, FILM COATED ORAL at 06:33

## 2017-12-03 NOTE — PROGRESS NOTE ADULT - PROBLEM SELECTOR PLAN 1
- Continue motrin, tylenol, oxycodone PRN for pain control.  - Increase ambulation  - Continue regular diet  - Discharge planning
- Continue with po analgesia  - Increase ambulation  - Continue regular diet  - IV lock  - No labs    ROMAINE Jefferson [email or web address]

## 2017-12-03 NOTE — PROGRESS NOTE ADULT - SUBJECTIVE AND OBJECTIVE BOX
S: Patient doing well. No complaints. Minimal lochia. Pain controlled.    O: Vital Signs Last 24 Hrs  T(C): 36.6 (03 Dec 2017 06:15), Max: 36.8 (02 Dec 2017 14:00)  T(F): 97.9 (03 Dec 2017 06:15), Max: 98.2 (02 Dec 2017 14:00)  HR: 54 (03 Dec 2017 06:15) (54 - 63)  BP: 131/78 (03 Dec 2017 06:15) (123/75 - 132/82)  BP(mean): --  RR: 18 (03 Dec 2017 06:15) (18 - 18)  SpO2: 97% (02 Dec 2017 21:26) (97% - 97%)    Gen: NAD  Abd: soft, Nontender, Nondistended, fundus firm  Inc C&D  Ext: no tenderness, mild edema    Labs:                        10.9   7.95  )-----------( 192      ( 03 Dec 2017 08:22 )             33.2       A: 42y PPD#3 s/p R C/S doing well.    Plan:  Routine postpartum care  Encouraged out of bed  Regular diet    Discharge  REGAN Varghese MD

## 2017-12-03 NOTE — PROGRESS NOTE ADULT - SUBJECTIVE AND OBJECTIVE BOX
OB Postpartum Note:  Delivery, POD#3    S: 41yo  POD#3 s/p rLTCS. The patient feels well.  Pain is well controlled. She is tolerating a regular diet and passing flatus. She is voiding spontaneously, and ambulating without difficulty. Denies CP/SOB. Denies lightheadedness/dizziness. Denies N/V.    O:  Vitals:  Vital Signs Last 24 Hrs  T(C): 36.6 (03 Dec 2017 06:15), Max: 36.8 (02 Dec 2017 14:00)  T(F): 97.9 (03 Dec 2017 06:15), Max: 98.2 (02 Dec 2017 14:00)  HR: 54 (03 Dec 2017 06:15) (54 - 63)  BP: 131/78 (03 Dec 2017 06:15) (123/75 - 132/82)  BP(mean): --  RR: 18 (03 Dec 2017 06:15) (18 - 18)  SpO2: 97% (02 Dec 2017 21:26) (97% - 97%)    MEDICATIONS  (STANDING):  acetaminophen   Tablet. 975 milliGRAM(s) Oral every 6 hours  diphtheria/tetanus/pertussis (acellular) Vaccine (ADAcel) 0.5 milliLiter(s) IntraMuscular once  famotidine    Tablet 20 milliGRAM(s) Oral daily  ferrous    sulfate 325 milliGRAM(s) Oral daily  heparin  Injectable 5000 Unit(s) SubCutaneous every 12 hours  ibuprofen  Tablet 600 milliGRAM(s) Oral every 6 hours  lactated ringers. 1000 milliLiter(s) (125 mL/Hr) IV Continuous <Continuous>  oxyCODONE    IR 5 milliGRAM(s) Oral every 3 hours  oxytocin Infusion 41.667 milliUNIT(s)/Min (125 mL/Hr) IV Continuous <Continuous>  oxytocin Infusion 41.667 milliUNIT(s)/Min (125 mL/Hr) IV Continuous <Continuous>  prenatal multivitamin 1 Tablet(s) Oral daily  sertraline 100 milliGRAM(s) Oral daily    MEDICATIONS  (PRN):  dexamethasone  Injectable 4 milliGRAM(s) IV Push every 6 hours PRN Nausea, IF ondansetron is ineffective after 30 - 60 minutes  diphenhydrAMINE   Capsule 25 milliGRAM(s) Oral every 6 hours PRN Itching  docusate sodium 100 milliGRAM(s) Oral two times a day PRN Stool Softening  glycerin Suppository - Adult 1 Suppository(s) Rectal at bedtime PRN Constipation  lanolin Ointment 1 Application(s) Topical every 3 hours PRN Sore Nipples  metoclopramide Injectable 10 milliGRAM(s) IV Push once PRN Nausea and/or Vomiting  naloxone Injectable 0.1 milliGRAM(s) IV Push every 3 minutes PRN For ANY of the following changes in patient status:  A. RR LESS THAN 10 breaths per minute, B. Oxygen saturation LESS THAN 90%, C. Sedation score of 6  ondansetron Injectable 4 milliGRAM(s) IV Push every 6 hours PRN Nausea  oxyCODONE    IR 5 milliGRAM(s) Oral every 4 hours PRN Severe Pain (7 - 10)  simethicone 80 milliGRAM(s) Chew every 4 hours PRN Gas      LABS:  Blood type: B Positive  Rubella IgG: RPR: Negative                          10.9<L>   10.6<H> >-----------< 154    ( 12- @ 07:12 )             30.9<L>                        12.4   9.7 >-----------< 161    ( 1130 @ 16:27 )             35.4                  Physical exam:  Gen: NAD  Abdomen: Soft, nontender, no distension , firm uterine fundus at umbilicus.  Incision: Clean, dry, and intact   Pelvic: Normal lochia noted  Ext: No calf tenderness        Korin Malone PGY-1

## 2017-12-03 NOTE — PROGRESS NOTE ADULT - ASSESSMENT
43y/o  POD#2 from LT in stable condition. PMHx postpartum depression currently on zoloft.
41yo   POD#3 s/p rLTCS.  Patient is stable and is doing well post-operatively.

## 2017-12-06 LAB — SURGICAL PATHOLOGY STUDY: SIGNIFICANT CHANGE UP

## 2018-01-01 NOTE — PROGRESS NOTE ADULT - ATTENDING COMMENTS
Called to bedside post-delivery of 38/6 baby with terminal mec. Arrived at <1 min of life. Baby crying and vigorous, skin to skin. Color pink by 1-2 mins of age. Breath sounds coarse but no signs of distress noted. Baby returned to mother. Cord gases collected and analyzed. No critical values.
agree with above  cont present [facility]  harpal coleman md
agree with above note  cont present mgmt  t virginia chavez

## 2018-05-15 ENCOUNTER — APPOINTMENT (OUTPATIENT)
Dept: OTOLARYNGOLOGY | Facility: CLINIC | Age: 43
End: 2018-05-15

## 2018-11-05 ENCOUNTER — OUTPATIENT (OUTPATIENT)
Dept: OUTPATIENT SERVICES | Facility: HOSPITAL | Age: 43
LOS: 1 days | End: 2018-11-05
Payer: COMMERCIAL

## 2018-11-05 VITALS
OXYGEN SATURATION: 98 % | SYSTOLIC BLOOD PRESSURE: 124 MMHG | HEART RATE: 74 BPM | WEIGHT: 138.89 LBS | DIASTOLIC BLOOD PRESSURE: 70 MMHG | RESPIRATION RATE: 18 BRPM | TEMPERATURE: 97 F | HEIGHT: 57.87 IN

## 2018-11-05 DIAGNOSIS — N92.0 EXCESSIVE AND FREQUENT MENSTRUATION WITH REGULAR CYCLE: ICD-10-CM

## 2018-11-05 DIAGNOSIS — F32.9 MAJOR DEPRESSIVE DISORDER, SINGLE EPISODE, UNSPECIFIED: ICD-10-CM

## 2018-11-05 DIAGNOSIS — Z01.818 ENCOUNTER FOR OTHER PREPROCEDURAL EXAMINATION: ICD-10-CM

## 2018-11-05 DIAGNOSIS — Z98.890 OTHER SPECIFIED POSTPROCEDURAL STATES: Chronic | ICD-10-CM

## 2018-11-05 DIAGNOSIS — Z98.891 HISTORY OF UTERINE SCAR FROM PREVIOUS SURGERY: Chronic | ICD-10-CM

## 2018-11-05 LAB
HCT VFR BLD CALC: 33.6 % — LOW (ref 34.5–45)
HGB BLD-MCNC: 11.1 G/DL — LOW (ref 11.5–15.5)
MCHC RBC-ENTMCNC: 25.2 PG — LOW (ref 27–34)
MCHC RBC-ENTMCNC: 33 GM/DL — SIGNIFICANT CHANGE UP (ref 32–36)
MCV RBC AUTO: 76.4 FL — LOW (ref 80–100)
PLATELET # BLD AUTO: 260 K/UL — SIGNIFICANT CHANGE UP (ref 150–400)
RBC # BLD: 4.4 M/UL — SIGNIFICANT CHANGE UP (ref 3.8–5.2)
RBC # FLD: 12.8 % — SIGNIFICANT CHANGE UP (ref 10.3–14.5)
WBC # BLD: 5.39 K/UL — SIGNIFICANT CHANGE UP (ref 3.8–10.5)
WBC # FLD AUTO: 5.39 K/UL — SIGNIFICANT CHANGE UP (ref 3.8–10.5)

## 2018-11-05 PROCEDURE — 85027 COMPLETE CBC AUTOMATED: CPT

## 2018-11-05 PROCEDURE — G0463: CPT

## 2018-11-05 RX ORDER — LIDOCAINE HCL 20 MG/ML
0.2 VIAL (ML) INJECTION ONCE
Qty: 0 | Refills: 0 | Status: DISCONTINUED | OUTPATIENT
Start: 2018-11-15 | End: 2018-11-30

## 2018-11-05 RX ORDER — SODIUM CHLORIDE 9 MG/ML
3 INJECTION INTRAMUSCULAR; INTRAVENOUS; SUBCUTANEOUS EVERY 8 HOURS
Qty: 0 | Refills: 0 | Status: DISCONTINUED | OUTPATIENT
Start: 2018-11-15 | End: 2018-11-30

## 2018-11-05 NOTE — H&P PST ADULT - HISTORY OF PRESENT ILLNESS
This is a 44 y/o female c/o prolonged and heavy menses, seen GYN for evaluation, recommended surgery, She presents today for hysteroscopy with franco

## 2018-11-05 NOTE — H&P PST ADULT - PMH
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

## 2018-11-05 NOTE — H&P PST ADULT - NSANTHOSAYNRD_GEN_A_CORE
No. TATE screening performed.  STOP BANG Legend: 0-2 = LOW Risk; 3-4 = INTERMEDIATE Risk; 5-8 = HIGH Risk

## 2018-11-14 ENCOUNTER — TRANSCRIPTION ENCOUNTER (OUTPATIENT)
Age: 43
End: 2018-11-14

## 2018-11-15 ENCOUNTER — RESULT REVIEW (OUTPATIENT)
Age: 43
End: 2018-11-15

## 2018-11-15 ENCOUNTER — OUTPATIENT (OUTPATIENT)
Dept: OUTPATIENT SERVICES | Facility: HOSPITAL | Age: 43
LOS: 1 days | End: 2018-11-15
Payer: COMMERCIAL

## 2018-11-15 VITALS
DIASTOLIC BLOOD PRESSURE: 85 MMHG | SYSTOLIC BLOOD PRESSURE: 144 MMHG | OXYGEN SATURATION: 98 % | HEART RATE: 65 BPM | TEMPERATURE: 98 F | RESPIRATION RATE: 20 BRPM

## 2018-11-15 VITALS
TEMPERATURE: 98 F | RESPIRATION RATE: 18 BRPM | WEIGHT: 138.89 LBS | HEART RATE: 63 BPM | SYSTOLIC BLOOD PRESSURE: 132 MMHG | DIASTOLIC BLOOD PRESSURE: 89 MMHG | OXYGEN SATURATION: 100 %

## 2018-11-15 DIAGNOSIS — Z98.890 OTHER SPECIFIED POSTPROCEDURAL STATES: Chronic | ICD-10-CM

## 2018-11-15 DIAGNOSIS — Z98.891 HISTORY OF UTERINE SCAR FROM PREVIOUS SURGERY: Chronic | ICD-10-CM

## 2018-11-15 DIAGNOSIS — N92.0 EXCESSIVE AND FREQUENT MENSTRUATION WITH REGULAR CYCLE: ICD-10-CM

## 2018-11-15 PROCEDURE — 88305 TISSUE EXAM BY PATHOLOGIST: CPT | Mod: 26

## 2018-11-15 PROCEDURE — 58563 HYSTEROSCOPY ABLATION: CPT

## 2018-11-15 PROCEDURE — 88305 TISSUE EXAM BY PATHOLOGIST: CPT

## 2018-11-15 RX ORDER — SODIUM CHLORIDE 9 MG/ML
1000 INJECTION, SOLUTION INTRAVENOUS
Qty: 0 | Refills: 0 | Status: DISCONTINUED | OUTPATIENT
Start: 2018-11-15 | End: 2018-11-30

## 2018-11-15 RX ORDER — CELECOXIB 200 MG/1
200 CAPSULE ORAL ONCE
Qty: 0 | Refills: 0 | Status: COMPLETED | OUTPATIENT
Start: 2018-11-15 | End: 2018-11-15

## 2018-11-15 RX ORDER — ONDANSETRON 8 MG/1
4 TABLET, FILM COATED ORAL ONCE
Qty: 0 | Refills: 0 | Status: DISCONTINUED | OUTPATIENT
Start: 2018-11-15 | End: 2018-11-30

## 2018-11-15 RX ORDER — SERTRALINE 25 MG/1
1 TABLET, FILM COATED ORAL
Qty: 0 | Refills: 0 | COMMUNITY

## 2018-11-15 RX ORDER — ACETAMINOPHEN 500 MG
1000 TABLET ORAL ONCE
Qty: 0 | Refills: 0 | Status: COMPLETED | OUTPATIENT
Start: 2018-11-15 | End: 2018-11-15

## 2018-11-15 RX ORDER — OXYCODONE HYDROCHLORIDE 5 MG/1
10 TABLET ORAL ONCE
Qty: 0 | Refills: 0 | Status: DISCONTINUED | OUTPATIENT
Start: 2018-11-15 | End: 2018-11-15

## 2018-11-15 RX ADMIN — CELECOXIB 200 MILLIGRAM(S): 200 CAPSULE ORAL at 07:55

## 2018-11-15 RX ADMIN — Medication 1000 MILLIGRAM(S): at 07:54

## 2018-11-15 RX ADMIN — CELECOXIB 200 MILLIGRAM(S): 200 CAPSULE ORAL at 09:58

## 2018-11-15 NOTE — ASU DISCHARGE PLAN (ADULT/PEDIATRIC). - NURSING INSTRUCTIONS
Tylenol/Motrin as needed for pain.  Next dose of Tylenol OK @ 2:00pm.  Next dose of Motrin OK @ 4:00pm.  Pads only, nothing vaginally until cleared by MD.  Follow-up with MD as requested; call office for appt.  You may resume any/all preop medications/supplements as before.

## 2018-11-15 NOTE — ASU DISCHARGE PLAN (ADULT/PEDIATRIC). - NOTIFY
Swelling that continues/Pain not relieved by Medications/Increased Irritability or Sluggishness/Persistent Nausea and Vomiting/Excessive Diarrhea/Bleeding that does not stop/GYN Fever>100.4/Numbness, tingling/Inability to Tolerate Liquids or Foods/Numbness, color, or temperature change to extremity/Unable to Urinate

## 2018-11-15 NOTE — ASU PATIENT PROFILE, ADULT - PMH
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

## 2018-11-15 NOTE — PRE-ANESTHESIA EVALUATION ADULT - NSANTHPMHFT_GEN_ALL_CORE
right  upper extrem ulnar nerve injury  no cardiopulm dis  no gerd  no anesthetic compications  b/l tubal liagation

## 2018-11-15 NOTE — ASU DISCHARGE PLAN (ADULT/PEDIATRIC). - MEDICATION SUMMARY - MEDICATIONS TO TAKE
I will START or STAY ON the medications listed below when I get home from the hospital:    ibuprofen 600 mg oral tablet  -- 1 tab(s) by mouth every 6 hours  -- Indication: For Pain    Tylenol 500 mg oral tablet  -- 2 tab(s) by mouth every 6 hours  -- Indication: For Pain

## 2018-11-15 NOTE — BRIEF OPERATIVE NOTE - PROCEDURE
<<-----Click on this checkbox to enter Procedure Hysteroscopy endometrial ablation  11/15/2018    Active  [facility]

## 2018-11-16 LAB — SURGICAL PATHOLOGY STUDY: SIGNIFICANT CHANGE UP

## 2019-10-18 ENCOUNTER — INPATIENT (INPATIENT)
Facility: HOSPITAL | Age: 44
LOS: 16 days | Discharge: HOME | End: 2019-11-04
Attending: PSYCHIATRY & NEUROLOGY | Admitting: PSYCHIATRY & NEUROLOGY
Payer: COMMERCIAL

## 2019-10-18 VITALS
TEMPERATURE: 98 F | OXYGEN SATURATION: 100 % | DIASTOLIC BLOOD PRESSURE: 86 MMHG | RESPIRATION RATE: 16 BRPM | SYSTOLIC BLOOD PRESSURE: 123 MMHG | HEART RATE: 77 BPM

## 2019-10-18 DIAGNOSIS — R69 ILLNESS, UNSPECIFIED: ICD-10-CM

## 2019-10-18 DIAGNOSIS — Z98.890 OTHER SPECIFIED POSTPROCEDURAL STATES: Chronic | ICD-10-CM

## 2019-10-18 DIAGNOSIS — Z98.891 HISTORY OF UTERINE SCAR FROM PREVIOUS SURGERY: Chronic | ICD-10-CM

## 2019-10-18 DIAGNOSIS — F33.2 MAJOR DEPRESSIVE DISORDER, RECURRENT SEVERE WITHOUT PSYCHOTIC FEATURES: ICD-10-CM

## 2019-10-18 LAB
ALBUMIN SERPL ELPH-MCNC: 4.3 G/DL — SIGNIFICANT CHANGE UP (ref 3.3–5)
ALP SERPL-CCNC: 61 U/L — SIGNIFICANT CHANGE UP (ref 40–120)
ALT FLD-CCNC: 11 U/L — SIGNIFICANT CHANGE UP (ref 4–33)
AMPHET UR-MCNC: NEGATIVE — SIGNIFICANT CHANGE UP
ANION GAP SERPL CALC-SCNC: 12 MMO/L — SIGNIFICANT CHANGE UP (ref 7–14)
APAP SERPL-MCNC: < 15 UG/ML — LOW (ref 15–25)
APPEARANCE UR: SIGNIFICANT CHANGE UP
AST SERPL-CCNC: 13 U/L — SIGNIFICANT CHANGE UP (ref 4–32)
BACTERIA # UR AUTO: HIGH
BARBITURATES UR SCN-MCNC: NEGATIVE — SIGNIFICANT CHANGE UP
BASOPHILS # BLD AUTO: 0.03 K/UL — SIGNIFICANT CHANGE UP (ref 0–0.2)
BASOPHILS NFR BLD AUTO: 0.3 % — SIGNIFICANT CHANGE UP (ref 0–2)
BENZODIAZ UR-MCNC: NEGATIVE — SIGNIFICANT CHANGE UP
BILIRUB SERPL-MCNC: 0.3 MG/DL — SIGNIFICANT CHANGE UP (ref 0.2–1.2)
BILIRUB UR-MCNC: NEGATIVE — SIGNIFICANT CHANGE UP
BLOOD UR QL VISUAL: HIGH
BUN SERPL-MCNC: 8 MG/DL — SIGNIFICANT CHANGE UP (ref 7–23)
CALCIUM SERPL-MCNC: 9.5 MG/DL — SIGNIFICANT CHANGE UP (ref 8.4–10.5)
CANNABINOIDS UR-MCNC: NEGATIVE — SIGNIFICANT CHANGE UP
CHLORIDE SERPL-SCNC: 100 MMOL/L — SIGNIFICANT CHANGE UP (ref 98–107)
CO2 SERPL-SCNC: 25 MMOL/L — SIGNIFICANT CHANGE UP (ref 22–31)
COCAINE METAB.OTHER UR-MCNC: NEGATIVE — SIGNIFICANT CHANGE UP
COLOR SPEC: YELLOW — SIGNIFICANT CHANGE UP
CREAT SERPL-MCNC: 0.78 MG/DL — SIGNIFICANT CHANGE UP (ref 0.5–1.3)
EOSINOPHIL # BLD AUTO: 0.02 K/UL — SIGNIFICANT CHANGE UP (ref 0–0.5)
EOSINOPHIL NFR BLD AUTO: 0.2 % — SIGNIFICANT CHANGE UP (ref 0–6)
EPI CELLS # UR: SIGNIFICANT CHANGE UP
ETHANOL BLD-MCNC: < 10 MG/DL — SIGNIFICANT CHANGE UP
GLUCOSE SERPL-MCNC: 93 MG/DL — SIGNIFICANT CHANGE UP (ref 70–99)
GLUCOSE UR-MCNC: NEGATIVE — SIGNIFICANT CHANGE UP
HCG SERPL-ACNC: < 5 MIU/ML — SIGNIFICANT CHANGE UP
HCT VFR BLD CALC: 45.3 % — HIGH (ref 34.5–45)
HGB BLD-MCNC: 13.9 G/DL — SIGNIFICANT CHANGE UP (ref 11.5–15.5)
IMM GRANULOCYTES NFR BLD AUTO: 0.4 % — SIGNIFICANT CHANGE UP (ref 0–1.5)
KETONES UR-MCNC: NEGATIVE — SIGNIFICANT CHANGE UP
LEUKOCYTE ESTERASE UR-ACNC: SIGNIFICANT CHANGE UP
LYMPHOCYTES # BLD AUTO: 1.4 K/UL — SIGNIFICANT CHANGE UP (ref 1–3.3)
LYMPHOCYTES # BLD AUTO: 14.7 % — SIGNIFICANT CHANGE UP (ref 13–44)
MCHC RBC-ENTMCNC: 25.3 PG — LOW (ref 27–34)
MCHC RBC-ENTMCNC: 30.7 % — LOW (ref 32–36)
MCV RBC AUTO: 82.5 FL — SIGNIFICANT CHANGE UP (ref 80–100)
METHADONE UR-MCNC: NEGATIVE — SIGNIFICANT CHANGE UP
MONOCYTES # BLD AUTO: 0.73 K/UL — SIGNIFICANT CHANGE UP (ref 0–0.9)
MONOCYTES NFR BLD AUTO: 7.7 % — SIGNIFICANT CHANGE UP (ref 2–14)
NEUTROPHILS # BLD AUTO: 7.29 K/UL — SIGNIFICANT CHANGE UP (ref 1.8–7.4)
NEUTROPHILS NFR BLD AUTO: 76.7 % — SIGNIFICANT CHANGE UP (ref 43–77)
NITRITE UR-MCNC: POSITIVE — HIGH
NRBC # FLD: 0 K/UL — SIGNIFICANT CHANGE UP (ref 0–0)
OPIATES UR-MCNC: NEGATIVE — SIGNIFICANT CHANGE UP
OXYCODONE UR-MCNC: NEGATIVE — SIGNIFICANT CHANGE UP
PCP UR-MCNC: NEGATIVE — SIGNIFICANT CHANGE UP
PH UR: 6.5 — SIGNIFICANT CHANGE UP (ref 5–8)
PLATELET # BLD AUTO: 324 K/UL — SIGNIFICANT CHANGE UP (ref 150–400)
PMV BLD: 8.9 FL — SIGNIFICANT CHANGE UP (ref 7–13)
POTASSIUM SERPL-MCNC: 4.2 MMOL/L — SIGNIFICANT CHANGE UP (ref 3.5–5.3)
POTASSIUM SERPL-SCNC: 4.2 MMOL/L — SIGNIFICANT CHANGE UP (ref 3.5–5.3)
PROT SERPL-MCNC: 7.1 G/DL — SIGNIFICANT CHANGE UP (ref 6–8.3)
PROT UR-MCNC: 200 — HIGH
RBC # BLD: 5.49 M/UL — HIGH (ref 3.8–5.2)
RBC # FLD: 13.2 % — SIGNIFICANT CHANGE UP (ref 10.3–14.5)
RBC CASTS # UR COMP ASSIST: SIGNIFICANT CHANGE UP (ref 0–?)
SALICYLATES SERPL-MCNC: < 5 MG/DL — LOW (ref 15–30)
SODIUM SERPL-SCNC: 137 MMOL/L — SIGNIFICANT CHANGE UP (ref 135–145)
SP GR SPEC: 1.02 — SIGNIFICANT CHANGE UP (ref 1–1.04)
TSH SERPL-MCNC: 1.23 UIU/ML — SIGNIFICANT CHANGE UP (ref 0.27–4.2)
UROBILINOGEN FLD QL: NORMAL — SIGNIFICANT CHANGE UP
WBC # BLD: 9.51 K/UL — SIGNIFICANT CHANGE UP (ref 3.8–10.5)
WBC # FLD AUTO: 9.51 K/UL — SIGNIFICANT CHANGE UP (ref 3.8–10.5)
WBC UR QL: SIGNIFICANT CHANGE UP (ref 0–?)

## 2019-10-18 PROCEDURE — 99285 EMERGENCY DEPT VISIT HI MDM: CPT

## 2019-10-18 RX ORDER — CLONAZEPAM 1 MG
0.5 TABLET ORAL
Refills: 0 | Status: DISCONTINUED | OUTPATIENT
Start: 2019-10-18 | End: 2019-10-19

## 2019-10-18 RX ORDER — ZOLPIDEM TARTRATE 10 MG/1
1 TABLET ORAL
Qty: 0 | Refills: 0 | DISCHARGE

## 2019-10-18 RX ORDER — IBUPROFEN 200 MG
1 TABLET ORAL
Qty: 0 | Refills: 0 | DISCHARGE

## 2019-10-18 RX ORDER — CLONAZEPAM 1 MG
0.5 TABLET ORAL EVERY 12 HOURS
Refills: 0 | Status: DISCONTINUED | OUTPATIENT
Start: 2019-10-18 | End: 2019-10-19

## 2019-10-18 RX ORDER — ACETAMINOPHEN 500 MG
2 TABLET ORAL
Qty: 0 | Refills: 0 | DISCHARGE

## 2019-10-18 RX ORDER — LURASIDONE HYDROCHLORIDE 40 MG/1
60 TABLET ORAL AT BEDTIME
Refills: 0 | Status: DISCONTINUED | OUTPATIENT
Start: 2019-10-18 | End: 2019-10-23

## 2019-10-18 RX ADMIN — Medication 1 MILLIGRAM(S): at 13:39

## 2019-10-18 RX ADMIN — Medication 0.5 MILLIGRAM(S): at 21:19

## 2019-10-18 RX ADMIN — Medication 0.5 MILLIGRAM(S): at 18:33

## 2019-10-18 RX ADMIN — LURASIDONE HYDROCHLORIDE 60 MILLIGRAM(S): 40 TABLET ORAL at 21:19

## 2019-10-18 NOTE — ED BEHAVIORAL HEALTH ASSESSMENT NOTE - CASE SUMMARY
44F with recurrent depression and a history of ECT presents with severe anxiety and depressive symptoms. Patient is highly anxious, tearful, is not able to work due to severe symptoms, and is seeking admission hoping to be evaluated for ECT due to her high level of distress. Based on assessment she meets criteria for inpatient level of care as she is functioning well below baseline. Appropriate for voluntary status, no 1:1 needed as pt not suicidal. Security transport.

## 2019-10-18 NOTE — ED BEHAVIORAL HEALTH ASSESSMENT NOTE - SUICIDE RISK FACTORS
Current mood episode/Insomnia/Agitation/Severe Anxiety/Panic/Anhedonia/Mood Disorder current/past/Recent onset of current/past psychiatric diagnosis/Family History of psychiatric diagnoses requiring hospitalization Agitation/Severe Anxiety/Panic/Anhedonia/Current mood episode/Family History of psychiatric diagnoses requiring hospitalization/Mood Disorder current/past/Insomnia/Hopelessness or despair/Recent onset of current/past psychiatric diagnosis Agitation/Severe Anxiety/Panic/Insomnia/Anhedonia/Other/Current mood episode/Mood Disorder current/past/Hopelessness or despair/Recent onset of current/past psychiatric diagnosis

## 2019-10-18 NOTE — ED ADULT TRIAGE NOTE - CHIEF COMPLAINT QUOTE
A&OX3 c/o increased anxiety over past couple of weeks with depression pt on medications takes them daily, recently medications were changed but does not feel like there working, pt tearful in triage reports insomnia and decrease PO intake last admission was in 2013, radha SI/HI Md Ceja called pt sent to

## 2019-10-18 NOTE — ED BEHAVIORAL HEALTH ASSESSMENT NOTE - DESCRIPTION
none see hpi;  at  K-21 During course of ED visit patient was  cooperative. Patient was not aggressive or violent. She endorsed anxiety and was offered and agreed to Ativan 1mg ordered at 13:11.     Vital Signs Last 24 Hrs  T(C): 36.8 (18 Oct 2019 11:31), Max: 36.8 (18 Oct 2019 11:31)  T(F): 98.2 (18 Oct 2019 11:31), Max: 98.2 (18 Oct 2019 11:31)  HR: 77 (18 Oct 2019 11:31) (77 - 77)  BP: 123/86 (18 Oct 2019 11:31) (123/86 - 123/86)  BP(mean): --  RR: 16 (18 Oct 2019 11:31) (16 - 16)  SpO2: 100% (18 Oct 2019 11:31) (100% - 100%) During course of ED visit patient was  cooperative. Patient was not aggressive or violent. She endorsed anxiety and was offered and agreed to Ativan 1mg ordered at 13:39.     Vital Signs Last 24 Hrs  T(C): 36.8 (18 Oct 2019 11:31), Max: 36.8 (18 Oct 2019 11:31)  T(F): 98.2 (18 Oct 2019 11:31), Max: 98.2 (18 Oct 2019 11:31)  HR: 77 (18 Oct 2019 11:31) (77 - 77)  BP: 123/86 (18 Oct 2019 11:31) (123/86 - 123/86)  BP(mean): --  RR: 16 (18 Oct 2019 11:31) (16 - 16)  SpO2: 100% (18 Oct 2019 11:31) (100% - 100%)

## 2019-10-18 NOTE — ED BEHAVIORAL HEALTH NOTE - BEHAVIORAL HEALTH NOTE
Writer provided patient's mother information about admission to [facility] with printed information to contact unit, patient phones and visiting hours. Writer tried to provide patient's mother information about admission to [facility] with printed information to contact unit, patient phones and visiting hours but pt's mother had left the lobby.

## 2019-10-18 NOTE — ED BEHAVIORAL HEALTH ASSESSMENT NOTE - SUICIDE PROTECTIVE FACTORS
Has future plans/Cultural, spiritual and/or moral attitudes against suicide/Frustration tolerance/Engaged in work or school/Identifies reasons for living/Supportive social network of family or friends/Positive therapeutic relationships/Other/Responsibility to family and others Cultural, spiritual and/or moral attitudes against suicide/Frustration tolerance/Engaged in work or school/Identifies reasons for living/Has future plans/Supportive social network of family or friends/Positive therapeutic relationships/Responsibility to family and others

## 2019-10-18 NOTE — ED PROVIDER NOTE - PHYSICAL EXAMINATION
Well appearing, well nourished, awake, alert, oriented to person, place, time/situation and in no apparent distress.    Airway patent    Eyes without scleral injection. No jaundice.    Strong pulse.    Respirations unlabored.    Abdomen soft, non-tender, no guarding.    Spine appears normal, range of motion is not limited, no muscle or joint tenderness    Alert and oriented, no gross motor or sensory deficits.    Skin normal color for race, warm, dry and intact. No evidence of rash.

## 2019-10-18 NOTE — ED ADULT NURSE NOTE - OBJECTIVE STATEMENT
Received pt in  pt c/o anxiety & depression denies si/hi/avh presently emotional reassurance provided safety & comfort measures maintained eval on going.

## 2019-10-18 NOTE — ED BEHAVIORAL HEALTH ASSESSMENT NOTE - OTHER
[facility] Reference #: [id number] children unable to function, not at baseline [id number] family history of severe depression requiring ECT

## 2019-10-18 NOTE — ED BEHAVIORAL HEALTH ASSESSMENT NOTE - RISK ASSESSMENT
Low Acute Suicide Risk Patient risk factors- depression, hopelessness, helplessness, decrease in ability to function, anhedonia, severe anxiety. Patient risk factors- depression, hopelessness, helplessness, decrease in ability to function, anhedonia, severe anxiety, not relief in symptoms from current treatment regimen. Patient risk factors- depression, hopelessness, helplessness, decrease in ability to function, anhedonia, severe anxiety, no relief in symptoms from current treatment regimen. Moderate Acute Suicide Risk

## 2019-10-18 NOTE — ED PROVIDER NOTE - CADM POA URETHRAL CATHETER
Stay off the prednisone and if the aches return take the mobic and if that does not help then let me know before resuming the prednisone.    See you in 4 months    Marky Josue M.D.     No

## 2019-10-18 NOTE — ED BEHAVIORAL HEALTH ASSESSMENT NOTE - HPI (INCLUDE ILLNESS QUALITY, SEVERITY, DURATION, TIMING, CONTEXT, MODIFYING FACTORS, ASSOCIATED SIGNS AND SYMPTOMS)
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  PPH MDD. Hx 2 past inpatient admissions for ECT last in 2013. No history of suicide attempts. No history of aggression, violence, legal issues or substance abuse problems. No PMH. BIB mother self referred for depression & anxiety.        See  note for collateral.    Called Dr. Mcfadden- [phone]- left message requesting call back. 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  PPH MDD. Hx 2 past inpatient admissions for ECT last in 2013. No history of suicide attempts. No history of aggression, violence, legal issues or substance abuse problems. No PMH. BIB mother self referred for depression & anxiety.    Patient reports she came to the ER due to worsening depression and anxiety. She reports she had been doing well since 2013 and maintained on Zoloft 100mg Daily. In June 2019 she started feeling depressed again but cannot identify a trigger. She finally sought the help of her psychiatrist August 2019 but stated at this point she was doing even worse. Her psychiatrist switched her from Zoloft to Cymbalta and Latuda but it was not helpful. She reports she started feeling more depressed and anxious and last week they encouraged her to take a vitamin called methylfolate and 2 days ago he switched her to Paxil and PRN Ambien.    Patient reports she has continued to not do well and cannot function as she normally does. She stated she feels extremely depressed , decreased sleep (waking up at 2am with severe anxiety), hopelessness, helplessness, poor energy, poor concentration and weight loss. She stated she has a severe generalized anxiety and feels extremely restless. She stated she is - going to work, showering, taking care of her kids but "I'm just going through the motions." She reports it is a struggle, cannot concentrate and finds no rose in anything. She stated "I'm not functioning properly." She stated she can no longer attend work due to her severity of symptoms.     Patient denies any hallucinations, does not report any delusional thought content, denies thought insertion/withdrawal, denies referential thought processes & is not paranoid on interview. Pt is linear,logical, organized and well related. Patient does not report nor exhibit any signs of digna, including irritable or elevated mood, grandiosity, pressured speech, risk-taking behaviors, increase in productivity or agitation. Patient adamantly denies SI, intent or plan; denies any HI, violent thoughts.     See  note for collateral.    Called Dr. Mcfadden- [phone]- left message requesting call back. 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  PPH MDD. Hx 2 past inpatient admissions for ECT last in 2013. No history of suicide attempts. No history of aggression, violence, legal issues or substance abuse problems. No PMH. BIB mother self referred for depression & anxiety.    Patient reports she came to the ER due to worsening depression and anxiety. She reports she had been doing well since 2013 and was maintained on Zoloft 100mg Daily. In June 2019 she started feeling depressed again but cannot identify a specific trigger leading to her reoccurrence of symptoms. She sought the help of her psychiatrist August 2019 but stated at this point she was feeling even more depressed. Her psychiatrist switched her from Zoloft to Cymbalta (unsure of dose) and Latuda but it was not helpful. She reports she started feeling more depressed and anxious and then last week psychiatrist encouraged her to take a vitamin called methylfolate and 2 days ago he switched her to Paxil  30mg and PRN Ambien.    Patient reports she has continued to not do well and cannot function anymore. She stated she feels extremely depressed , has decreased sleep (waking up at 2am with severe anxiety), hopelessness that she won't get better, helplessness, poor energy, poor concentration and weight loss (unsure of amount). She stated she has a severe generalized anxiety and feels extremely restless. She stated she has been - going to work, showering, taking care of her kids but "I'm just going through the motions." She reports it is a struggle, she cannot concentrate, is not functioning at work and finds no rose in anything. She stated "I'm not functioning properly." She stated she can no longer attend work due to severity of her depressive/anxiety symptoms and doesn't feel she can be around her kids because she is not functioning.     Patient denies any hallucinations, does not report any delusional thought content, denies thought insertion/withdrawal, denies referential thought processes & is not paranoid on interview. Pt is linear,logical, organized and well related. Patient does not report nor exhibit any signs of digna, including irritable or elevated mood, grandiosity, pressured speech, risk-taking behaviors, increase in productivity or agitation. Patient adamantly denies SI, intent or plan; denies any HI, violent thoughts.     See  note for collateral.    Called Dr. Mcfadden- [phone]- left message requesting call back. 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  PPH MDD. Hx 2 past inpatient admissions for ECT last in 2013. No history of suicide attempts. No history of aggression, violence, legal issues or substance abuse problems. No PMH. BIB mother self referred for depression & anxiety.    Patient reports she came to the ER due to worsening depression and anxiety. She reports she had been doing well since 2013 and was maintained on Zoloft 100mg Daily. In June 2019 she started feeling depressed again but cannot identify a specific trigger leading to her reoccurrence of symptoms. She sought the help of her psychiatrist August 2019 but stated at this point she was feeling even more depressed. Her psychiatrist switched her from Zoloft to Cymbalta (unsure of dose) and Latuda but it was not helpful. She reports she started feeling more depressed and anxious and then last week psychiatrist encouraged her to take a vitamin called methylfolate and 2 days ago he switched her to Paxil 20mg and PRN Ambien.    Patient reports she has continued to not do well and cannot function anymore. She stated she feels extremely depressed , has decreased sleep (waking up at 2am with severe anxiety), hopelessness that she won't get better, helplessness, poor energy, poor concentration and weight loss (unsure of amount). She stated she has a severe generalized anxiety and feels extremely restless. She stated she has been - going to work, showering, taking care of her kids but "I'm just going through the motions." She reports it is a struggle, she cannot concentrate, is not functioning at work and finds no rose in anything. She stated "I'm not functioning properly." She stated she can no longer attend work due to severity of her depressive/anxiety symptoms and doesn't feel she can be around her kids because she is not functioning.     Patient denies any hallucinations, does not report any delusional thought content, denies thought insertion/withdrawal, denies referential thought processes & is not paranoid on interview. Pt is linear,logical, organized and well related. Patient does not report nor exhibit any signs of digna, including irritable or elevated mood, grandiosity, pressured speech, risk-taking behaviors, increase in productivity or agitation. Patient adamantly denies SI, intent or plan; denies any HI, violent thoughts.     See  note for collateral.    Called Dr. Mcfadden- [phone]- left message requesting call back. 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  PPH MDD. Hx 2 past inpatient admissions for ECT last in 2013. No history of suicide attempts. No history of aggression, violence, legal issues or substance abuse problems. No PMH. BIB mother self referred for depression & anxiety.    Patient reports she came to the ER due to worsening depression and anxiety. She reports she had been doing well since 2013 and was maintained on Zoloft 100mg Daily. In June 2019 she started feeling depressed again but cannot identify a specific trigger leading to her reoccurrence of symptoms. She sought the help of her psychiatrist August 2019 but stated at this point she was feeling even more depressed. Her psychiatrist switched her from Zoloft to Cymbalta (unsure of dose) and Latuda but it was not helpful. She reports she started feeling more depressed and anxious and then last week psychiatrist encouraged her to take a vitamin called methylfolate and 2 days ago he switched her to Paxil 20mg and PRN Ambien.    Patient reports her depression/anxiety has gotten worse and cannot function anymore. She stated she feels extremely depressed , has decreased sleep (waking up at 2am with severe anxiety), hopelessness that she won't get better, helplessness, poor energy, poor concentration and weight loss (unsure of amount). She stated she has a severe generalized anxiety and feels extremely restless. She stated she has been - going to work, showering, taking care of her kids but "I'm just going through the motions." She reports it is a struggle and she cannot concentrate, is not functioning at work and finds no rose in anything. She stated "I'm not functioning properly." She stated she can no longer attend work due to severity of her depressive/anxiety symptoms and doesn't feel she can be around her kids because she is not functioning.     Patient denies any hallucinations, does not report any delusional thought content, denies thought insertion/withdrawal, denies referential thought processes & is not paranoid on interview. Pt is linear,logical, organized and well related. Patient does not report nor exhibit any signs of digna, including irritable or elevated mood, grandiosity, pressured speech, risk-taking behaviors, increase in productivity or agitation. Patient adamantly denies SI, intent or plan; denies any HI, violent thoughts.     See  note for collateral.    Called Dr. Mcfadden- [phone]- left message requesting call back.

## 2019-10-18 NOTE — ED BEHAVIORAL HEALTH ASSESSMENT NOTE - VIOLENCE PROTECTIVE FACTORS:
Employment stability/Good treatment response/compliance/Relationship stability/Residential stability/Sobriety/Engagement in treatment

## 2019-10-18 NOTE — ED BEHAVIORAL HEALTH ASSESSMENT NOTE - DETAILS
father- MDD, history of ECT  is a - has a gun in locked safe that patient has no access to. at , then will be with /grandparents n/a family Dr. Sorenson mother aware none

## 2019-10-18 NOTE — ED BEHAVIORAL HEALTH ASSESSMENT NOTE - SUMMARY
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  PPH MDD. Hx 2 past inpatient admissions for ECT last in 2013. No history of suicide attempts. No history of aggression, violence, legal issues or substance abuse problems. No PMH. BIB mother self referred for depression & anxiety. 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  PPH MDD. Hx 2 past inpatient admissions for ECT last in 2013. No history of suicide attempts. No history of aggression, violence, legal issues or substance abuse problems. No PMH. BIB mother self referred for depression & anxiety.    Patient presents to the ER in the context of depression/anxiety. Patient reports worsening depression/anxiety since June 2019. She has had multiple medications and no relief of symptoms. Previous history of successful treatment with ECT x 2. Patient reports her symptoms are severely impacting her ability to function and she is not at baseline. She is not eating or sleeping and unable to continue attending work due to severity of symptoms. She presents at imminent risk and is requesting and agreeing to voluntary inpatient admission.     Patient denies SI/HI. Patient is not aggressive or violent. Patient agreed verbally could alert staff if had worsening symptoms or urges to harm self or others. No 1:1 needed. 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  PPH MDD. Hx 2 past inpatient admissions for ECT last in 2013. No history of suicide attempts. No history of aggression, violence, legal issues or substance abuse problems. No PMH. BIB mother self referred for depression & anxiety.    Patient presents to the ER in the context of worsening depression/anxiety. Patient reports worsening depression/anxiety since June 2019 without any known trigger. She has had multiple medication changes and no relief of symptoms. She has a previous history of successful treatment with ECT in 2011/2013. Patient reports her symptoms are severely impacting her ability to function and she is not at baseline. She is not eating or sleeping, has severe anhedonia, is hopeless and is unable to continue attending work due to her severity of symptoms. She is unable to function and is requesting and agreeing to voluntary inpatient admission.     Patient denies SI/HI. Patient is not aggressive or violent. Patient agreed verbally could alert staff if had worsening symptoms or urges to harm self or others. No 1:1 needed.    Recommend ECT consult

## 2019-10-18 NOTE — ED PROVIDER NOTE - CLINICAL SUMMARY MEDICAL DECISION MAKING FREE TEXT BOX
Brenna: Depression, significantly interfering with quality of life, failing outpatient therapy. No SI/HI. DDx includes primary psych disorder, ETOH or drug intoxication, thyroid disorder, or infection triggering mental status or behavioral changes. Will get labs and Psych consult.

## 2019-10-18 NOTE — ED BEHAVIORAL HEALTH NOTE - BEHAVIORAL HEALTH NOTE
Patient is a 44 year old female brought in to the emergency room by her mother.  Writer met with pt's mother Gina Alarcon  (cell).  Pt resides with her partner Isaias and their 3 children ages 6, 4, and 1.5 years.  Patient is employed as a  with the [employer], last worked yesterday.  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  She states in June patient was told by a new  that she would be placed in a classroom to teach.  Patient began to have a lot of anxiety in June saw her psychiatrist who started Zoloft, patient complained it didn't work then patient was started on Cymbalta and Latuda, patient reported anxiety and was most recently changed to Paxil and latuda.  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  She states patient told her yesterday she is having "same feelings" as she did in 2013 when she was treated at [facility].  She states patient suffers from childhood depression. Pt's father suffers from depression and both have received ECT with good effect.  She states patient is treated by outpatient psychiatrist Kirill Bolivar .  She denies patient is suicidal, homicidal.  She reports patient is sleeping 4 hours a night and has lost 5 lbs since June.  She reports patient is complaining of bugs crawling inside her and making statements that she's sick and not getting better because no one will help her.  Pt's mother does not believe patient requires admission to hospital stating her daughter just needs something for anxiety.  She states patient was seeking psychiatric admission.

## 2019-10-18 NOTE — ED ADULT NURSE REASSESSMENT NOTE - NS ED NURSE REASSESS COMMENT FT1
Evaluated and cleared by Psych / ER attending for admission pt calm & cooperative c/o depression & anxiety emotional reassurance provided pt denies s/i h/avh presently pt made aware of admission to [address], transported by security & staff.
complains of pain/discomfort

## 2019-10-18 NOTE — ED PROVIDER NOTE - CARE PLAN
Principal Discharge DX:	Depression Principal Discharge DX:	MDD (major depressive disorder), recurrent severe, without psychosis

## 2019-10-18 NOTE — ED PROVIDER NOTE - ATTENDING CONTRIBUTION TO CARE
I performed a face-to-face evaluation of the patient and performed a history and physical examination. I agree with the history and physical examination.    Brenna: 44 F, depression (Psych Dr. Brady Govea), was on Zoloft 5 years; switched to Cymbalta and Lituda over the summer (failed those), and then Paxil (only 2 days). P/w difficulty sleeping, not eating, sad, crying. No SI/HI. No hallucinations. No physical complaints.

## 2019-10-18 NOTE — ED BEHAVIORAL HEALTH ASSESSMENT NOTE - PSYCHIATRIC ISSUES AND PLAN (INCLUDE STANDING AND PRN MEDICATION)
Continue Paxil 30mg Daily (started 2 days ago), Latuda 60mg QHS, Start Klonopin 0.5mg BID, Klonopin 0.5mg PRN Q12, Ativan 2mg IM PRN Continue Paxil 20mg Daily (started 2 days ago), Latuda 60mg QHS, Start Klonopin 0.5mg BID, Klonopin 0.5mg PRN Q12, Ativan 2mg IM PRN

## 2019-10-18 NOTE — ED BEHAVIORAL HEALTH ASSESSMENT NOTE - CURRENT MEDICATION
Paxil 30mg QD, Latuda 60mg QD, Methylfolate Daily, Ambien 10mg QHS PRN Paxil 20mg QAM, Latuda 60mg QHS, Methylfolate OTC Vitamin Daily, Ambien 10mg QHS PRN    Confirmed Medications at [facility]-  [phone] Paxil 20mg QAM, Latuda 60mg QHS, Methylfolate OTC Vitamin Daily, Ambien 10mg QHS PRN    Confirmed Medications at [facility]-  [phone]; [facility] Reference #: [id number]

## 2019-10-18 NOTE — ED PROVIDER NOTE - OBJECTIVE STATEMENT
Brenna: 44 F, depression (Psych Dr. Brady Govea), was on Zoloft 5 years; switched to Cymbalta and Lituda over the summer (failed those), and then Paxil (only 2 days). P/w difficulty sleeping, not eating, sad, crying. No SI/HI. No hallucinations. No physical complaints.

## 2019-10-18 NOTE — ED BEHAVIORAL HEALTH ASSESSMENT NOTE - OTHER PAST PSYCHIATRIC HISTORY (INCLUDE DETAILS REGARDING ONSET, COURSE OF ILLNESS, INPATIENT/OUTPATIENT TREATMENT)
PPH MDD. Hx 2 past inpatient admissions for ECT last in 2013. No history of suicide attempts. Patient first developed depression in 20s after she got . She took Paxil and did well. In 2011 she was hospitalized and received ECT precipitated by divorce. She responded well and was prescribed Zoloft. She remarried and eventually stopped Zoloft but in 2013 she had a reoccurrence of depression while in her third trimester with her first child. She was diagnosed with PPD and was hospitalized and received ECT. Since 2013 she has been maintained on Zoloft 100mg QD. Until this past August when she became medication changes due to depression/anxiety. See HPI. PPH MDD. Hx 2 past inpatient admissions for ECT last in 2013. No history of suicide attempts. Patient first developed depression in 20s after she got . She took Paxil and did well. In 2011 she was hospitalized and received ECT precipitated by divorce. She responded well and was prescribed Zoloft. She remarried and eventually stopped Zoloft but in 2013 she had a reoccurrence of depression while in her third trimester pregnant with her first child. She was diagnosed with PPD and was hospitalized and received ECT. Since 2013 she has been maintained on Zoloft 100mg QD until this past August when she became medication changes due to depression/anxiety. See HPI. Patient's outpatient treater is Dr. Mcfadden.

## 2019-10-19 LAB — SPECIMEN SOURCE: SIGNIFICANT CHANGE UP

## 2019-10-19 PROCEDURE — 99222 1ST HOSP IP/OBS MODERATE 55: CPT

## 2019-10-19 RX ORDER — CLONAZEPAM 1 MG
0.5 TABLET ORAL THREE TIMES A DAY
Refills: 0 | Status: DISCONTINUED | OUTPATIENT
Start: 2019-10-19 | End: 2019-10-22

## 2019-10-19 RX ORDER — CLONAZEPAM 1 MG
0.5 TABLET ORAL
Refills: 0 | Status: DISCONTINUED | OUTPATIENT
Start: 2019-10-19 | End: 2019-10-22

## 2019-10-19 RX ADMIN — Medication 20 MILLIGRAM(S): at 08:06

## 2019-10-19 RX ADMIN — Medication 0.5 MILLIGRAM(S): at 17:46

## 2019-10-19 RX ADMIN — Medication 0.5 MILLIGRAM(S): at 12:49

## 2019-10-19 RX ADMIN — Medication 0.5 MILLIGRAM(S): at 08:06

## 2019-10-19 RX ADMIN — LURASIDONE HYDROCHLORIDE 60 MILLIGRAM(S): 40 TABLET ORAL at 20:29

## 2019-10-19 RX ADMIN — Medication 0.5 MILLIGRAM(S): at 20:13

## 2019-10-20 LAB
-  AMIKACIN: SIGNIFICANT CHANGE UP
-  AMPICILLIN/SULBACTAM: SIGNIFICANT CHANGE UP
-  AMPICILLIN: SIGNIFICANT CHANGE UP
-  AZTREONAM: SIGNIFICANT CHANGE UP
-  CEFAZOLIN: SIGNIFICANT CHANGE UP
-  CEFEPIME: SIGNIFICANT CHANGE UP
-  CEFOXITIN: SIGNIFICANT CHANGE UP
-  CEFTAZIDIME: SIGNIFICANT CHANGE UP
-  CEFTRIAXONE: SIGNIFICANT CHANGE UP
-  ERTAPENEM: SIGNIFICANT CHANGE UP
-  GENTAMICIN: SIGNIFICANT CHANGE UP
-  IMIPENEM: SIGNIFICANT CHANGE UP
-  LEVOFLOXACIN: SIGNIFICANT CHANGE UP
-  MEROPENEM: SIGNIFICANT CHANGE UP
-  NITROFURANTOIN: SIGNIFICANT CHANGE UP
-  PIPERACILLIN/TAZOBACTAM: SIGNIFICANT CHANGE UP
-  TIGECYCLINE: SIGNIFICANT CHANGE UP
-  TOBRAMYCIN: SIGNIFICANT CHANGE UP
-  TRIMETHOPRIM/SULFAMETHOXAZOLE: SIGNIFICANT CHANGE UP
BACTERIA UR CULT: SIGNIFICANT CHANGE UP
METHOD TYPE: SIGNIFICANT CHANGE UP
ORGANISM # SPEC MICROSCOPIC CNT: SIGNIFICANT CHANGE UP
ORGANISM # SPEC MICROSCOPIC CNT: SIGNIFICANT CHANGE UP

## 2019-10-20 PROCEDURE — 99232 SBSQ HOSP IP/OBS MODERATE 35: CPT

## 2019-10-20 RX ADMIN — Medication 0.5 MILLIGRAM(S): at 11:35

## 2019-10-20 RX ADMIN — Medication 30 MILLIGRAM(S): at 08:23

## 2019-10-20 RX ADMIN — Medication 0.5 MILLIGRAM(S): at 08:23

## 2019-10-20 RX ADMIN — Medication 0.5 MILLIGRAM(S): at 20:12

## 2019-10-20 RX ADMIN — LURASIDONE HYDROCHLORIDE 60 MILLIGRAM(S): 40 TABLET ORAL at 20:13

## 2019-10-20 RX ADMIN — Medication 0.5 MILLIGRAM(S): at 18:44

## 2019-10-21 PROCEDURE — 99232 SBSQ HOSP IP/OBS MODERATE 35: CPT | Mod: 25

## 2019-10-21 PROCEDURE — 90853 GROUP PSYCHOTHERAPY: CPT

## 2019-10-21 RX ORDER — CEFPODOXIME PROXETIL 100 MG
100 TABLET ORAL EVERY 12 HOURS
Refills: 0 | Status: COMPLETED | OUTPATIENT
Start: 2019-10-21 | End: 2019-10-26

## 2019-10-21 RX ORDER — PHENAZOPYRIDINE HCL 100 MG
100 TABLET ORAL EVERY 8 HOURS
Refills: 0 | Status: DISCONTINUED | OUTPATIENT
Start: 2019-10-21 | End: 2019-11-04

## 2019-10-21 RX ADMIN — Medication 100 MILLIGRAM(S): at 20:42

## 2019-10-21 RX ADMIN — Medication 0.5 MILLIGRAM(S): at 08:23

## 2019-10-21 RX ADMIN — LURASIDONE HYDROCHLORIDE 60 MILLIGRAM(S): 40 TABLET ORAL at 20:42

## 2019-10-21 RX ADMIN — Medication 0.5 MILLIGRAM(S): at 20:42

## 2019-10-21 RX ADMIN — Medication 0.5 MILLIGRAM(S): at 16:33

## 2019-10-21 RX ADMIN — Medication 30 MILLIGRAM(S): at 09:32

## 2019-10-21 RX ADMIN — Medication 100 MILLIGRAM(S): at 16:35

## 2019-10-21 NOTE — ED BEHAVIORAL HEALTH NOTE - BEHAVIORAL HEALTH NOTE
KRYSTLE spoke with Ann RAMIRES at [facility], [phone] to obtain authorization. Auth# [id number], dates: 10/18/19-10/22/19, next review date 10/22/19, reviewer REYES, review contact # [phone].

## 2019-10-21 NOTE — CHART NOTE - NSCHARTNOTEFT_GEN_A_CORE
Patient with uncomplicated cystitis. Uculture growing pansensitive E.coli. Recommend Cefpodoxime 100mg BID for 5 days. (ordered). Called ext [phone] and left voicemail with plan.

## 2019-10-22 LAB
HCG UR-SCNC: NEGATIVE — SIGNIFICANT CHANGE UP
SP GR UR: 1.02 — SIGNIFICANT CHANGE UP (ref 1–1.03)

## 2019-10-22 PROCEDURE — 99232 SBSQ HOSP IP/OBS MODERATE 35: CPT

## 2019-10-22 RX ORDER — CLONAZEPAM 1 MG
0.5 TABLET ORAL THREE TIMES A DAY
Refills: 0 | Status: DISCONTINUED | OUTPATIENT
Start: 2019-10-22 | End: 2019-10-29

## 2019-10-22 RX ORDER — CLONAZEPAM 1 MG
0.5 TABLET ORAL
Refills: 0 | Status: DISCONTINUED | OUTPATIENT
Start: 2019-10-22 | End: 2019-10-29

## 2019-10-22 RX ADMIN — LURASIDONE HYDROCHLORIDE 60 MILLIGRAM(S): 40 TABLET ORAL at 19:58

## 2019-10-22 RX ADMIN — Medication 0.5 MILLIGRAM(S): at 08:21

## 2019-10-22 RX ADMIN — Medication 100 MILLIGRAM(S): at 08:21

## 2019-10-22 RX ADMIN — Medication 100 MILLIGRAM(S): at 19:59

## 2019-10-22 RX ADMIN — Medication 30 MILLIGRAM(S): at 08:21

## 2019-10-22 RX ADMIN — Medication 0.5 MILLIGRAM(S): at 19:59

## 2019-10-23 PROCEDURE — 90870 ELECTROCONVULSIVE THERAPY: CPT

## 2019-10-23 PROCEDURE — 99232 SBSQ HOSP IP/OBS MODERATE 35: CPT | Mod: 25

## 2019-10-23 RX ORDER — IBUPROFEN 200 MG
400 TABLET ORAL EVERY 6 HOURS
Refills: 0 | Status: DISCONTINUED | OUTPATIENT
Start: 2019-10-23 | End: 2019-11-04

## 2019-10-23 RX ORDER — MIDAZOLAM HYDROCHLORIDE 1 MG/ML
2 INJECTION, SOLUTION INTRAMUSCULAR; INTRAVENOUS ONCE
Refills: 0 | Status: DISCONTINUED | OUTPATIENT
Start: 2019-10-23 | End: 2019-10-23

## 2019-10-23 RX ORDER — LURASIDONE HYDROCHLORIDE 40 MG/1
40 TABLET ORAL
Refills: 0 | Status: DISCONTINUED | OUTPATIENT
Start: 2019-10-23 | End: 2019-11-04

## 2019-10-23 RX ADMIN — Medication 30 MILLIGRAM(S): at 08:07

## 2019-10-23 RX ADMIN — Medication 400 MILLIGRAM(S): at 13:55

## 2019-10-23 RX ADMIN — Medication 100 MILLIGRAM(S): at 21:32

## 2019-10-23 RX ADMIN — LURASIDONE HYDROCHLORIDE 40 MILLIGRAM(S): 40 TABLET ORAL at 16:55

## 2019-10-23 RX ADMIN — Medication 0.5 MILLIGRAM(S): at 08:07

## 2019-10-23 RX ADMIN — Medication 0.5 MILLIGRAM(S): at 21:32

## 2019-10-23 RX ADMIN — Medication 400 MILLIGRAM(S): at 13:20

## 2019-10-23 RX ADMIN — Medication 100 MILLIGRAM(S): at 08:07

## 2019-10-23 RX ADMIN — Medication 0.5 MILLIGRAM(S): at 16:52

## 2019-10-24 PROCEDURE — 99232 SBSQ HOSP IP/OBS MODERATE 35: CPT | Mod: 25

## 2019-10-24 PROCEDURE — 90853 GROUP PSYCHOTHERAPY: CPT

## 2019-10-24 RX ADMIN — Medication 100 MILLIGRAM(S): at 08:35

## 2019-10-24 RX ADMIN — Medication 30 MILLIGRAM(S): at 08:35

## 2019-10-24 RX ADMIN — Medication 0.5 MILLIGRAM(S): at 08:35

## 2019-10-24 RX ADMIN — Medication 0.5 MILLIGRAM(S): at 20:39

## 2019-10-24 RX ADMIN — LURASIDONE HYDROCHLORIDE 40 MILLIGRAM(S): 40 TABLET ORAL at 17:21

## 2019-10-24 RX ADMIN — Medication 0.5 MILLIGRAM(S): at 12:04

## 2019-10-24 RX ADMIN — Medication 100 MILLIGRAM(S): at 20:39

## 2019-10-25 PROCEDURE — 99232 SBSQ HOSP IP/OBS MODERATE 35: CPT | Mod: 25

## 2019-10-25 PROCEDURE — 90870 ELECTROCONVULSIVE THERAPY: CPT

## 2019-10-25 RX ORDER — ACETAMINOPHEN 500 MG
650 TABLET ORAL EVERY 6 HOURS
Refills: 0 | Status: DISCONTINUED | OUTPATIENT
Start: 2019-10-25 | End: 2019-11-04

## 2019-10-25 RX ADMIN — Medication 40 MILLIGRAM(S): at 08:26

## 2019-10-25 RX ADMIN — Medication 0.5 MILLIGRAM(S): at 19:51

## 2019-10-25 RX ADMIN — Medication 100 MILLIGRAM(S): at 19:51

## 2019-10-25 RX ADMIN — Medication 100 MILLIGRAM(S): at 08:26

## 2019-10-25 RX ADMIN — Medication 650 MILLIGRAM(S): at 15:23

## 2019-10-25 RX ADMIN — LURASIDONE HYDROCHLORIDE 40 MILLIGRAM(S): 40 TABLET ORAL at 17:53

## 2019-10-25 RX ADMIN — Medication 0.5 MILLIGRAM(S): at 08:26

## 2019-10-26 RX ADMIN — Medication 40 MILLIGRAM(S): at 08:25

## 2019-10-26 RX ADMIN — Medication 100 MILLIGRAM(S): at 08:26

## 2019-10-26 RX ADMIN — Medication 0.5 MILLIGRAM(S): at 08:25

## 2019-10-26 RX ADMIN — Medication 0.5 MILLIGRAM(S): at 19:42

## 2019-10-26 RX ADMIN — LURASIDONE HYDROCHLORIDE 40 MILLIGRAM(S): 40 TABLET ORAL at 17:15

## 2019-10-27 RX ADMIN — LURASIDONE HYDROCHLORIDE 40 MILLIGRAM(S): 40 TABLET ORAL at 17:26

## 2019-10-27 RX ADMIN — Medication 0.5 MILLIGRAM(S): at 21:33

## 2019-10-27 RX ADMIN — Medication 0.5 MILLIGRAM(S): at 08:10

## 2019-10-27 RX ADMIN — Medication 40 MILLIGRAM(S): at 08:10

## 2019-10-28 PROCEDURE — 99232 SBSQ HOSP IP/OBS MODERATE 35: CPT | Mod: 25

## 2019-10-28 PROCEDURE — 90870 ELECTROCONVULSIVE THERAPY: CPT

## 2019-10-28 RX ADMIN — Medication 0.5 MILLIGRAM(S): at 20:27

## 2019-10-28 RX ADMIN — LURASIDONE HYDROCHLORIDE 40 MILLIGRAM(S): 40 TABLET ORAL at 16:56

## 2019-10-28 RX ADMIN — Medication 40 MILLIGRAM(S): at 07:49

## 2019-10-28 RX ADMIN — Medication 0.5 MILLIGRAM(S): at 07:49

## 2019-10-28 RX ADMIN — Medication 650 MILLIGRAM(S): at 14:45

## 2019-10-29 PROCEDURE — 99232 SBSQ HOSP IP/OBS MODERATE 35: CPT

## 2019-10-29 RX ORDER — CLONAZEPAM 1 MG
0.5 TABLET ORAL
Refills: 0 | Status: DISCONTINUED | OUTPATIENT
Start: 2019-10-29 | End: 2019-11-04

## 2019-10-29 RX ORDER — CLONAZEPAM 1 MG
0.5 TABLET ORAL THREE TIMES A DAY
Refills: 0 | Status: DISCONTINUED | OUTPATIENT
Start: 2019-10-29 | End: 2019-11-04

## 2019-10-29 RX ADMIN — Medication 40 MILLIGRAM(S): at 08:57

## 2019-10-29 RX ADMIN — Medication 0.5 MILLIGRAM(S): at 08:57

## 2019-10-29 RX ADMIN — LURASIDONE HYDROCHLORIDE 40 MILLIGRAM(S): 40 TABLET ORAL at 16:55

## 2019-10-29 RX ADMIN — Medication 0.5 MILLIGRAM(S): at 20:07

## 2019-10-30 LAB
HCG UR-SCNC: NEGATIVE — SIGNIFICANT CHANGE UP
SP GR UR: 1.01 — SIGNIFICANT CHANGE UP (ref 1–1.04)

## 2019-10-30 PROCEDURE — 99231 SBSQ HOSP IP/OBS SF/LOW 25: CPT | Mod: 25

## 2019-10-30 PROCEDURE — 90870 ELECTROCONVULSIVE THERAPY: CPT

## 2019-10-30 RX ADMIN — Medication 0.5 MILLIGRAM(S): at 07:57

## 2019-10-30 RX ADMIN — Medication 0.5 MILLIGRAM(S): at 19:55

## 2019-10-30 RX ADMIN — Medication 50 MILLIGRAM(S): at 07:58

## 2019-10-30 RX ADMIN — LURASIDONE HYDROCHLORIDE 40 MILLIGRAM(S): 40 TABLET ORAL at 17:00

## 2019-10-31 PROCEDURE — 99231 SBSQ HOSP IP/OBS SF/LOW 25: CPT | Mod: 25

## 2019-10-31 PROCEDURE — 90853 GROUP PSYCHOTHERAPY: CPT

## 2019-10-31 RX ADMIN — Medication 50 MILLIGRAM(S): at 09:13

## 2019-10-31 RX ADMIN — Medication 0.5 MILLIGRAM(S): at 09:13

## 2019-10-31 RX ADMIN — LURASIDONE HYDROCHLORIDE 40 MILLIGRAM(S): 40 TABLET ORAL at 17:49

## 2019-10-31 RX ADMIN — Medication 0.5 MILLIGRAM(S): at 20:22

## 2019-11-01 PROCEDURE — 99232 SBSQ HOSP IP/OBS MODERATE 35: CPT | Mod: 25

## 2019-11-01 PROCEDURE — 90870 ELECTROCONVULSIVE THERAPY: CPT

## 2019-11-01 RX ADMIN — Medication 50 MILLIGRAM(S): at 08:46

## 2019-11-01 RX ADMIN — Medication 0.5 MILLIGRAM(S): at 20:11

## 2019-11-01 RX ADMIN — Medication 0.5 MILLIGRAM(S): at 08:46

## 2019-11-01 RX ADMIN — LURASIDONE HYDROCHLORIDE 40 MILLIGRAM(S): 40 TABLET ORAL at 17:59

## 2019-11-02 RX ADMIN — Medication 0.5 MILLIGRAM(S): at 20:14

## 2019-11-02 RX ADMIN — LURASIDONE HYDROCHLORIDE 40 MILLIGRAM(S): 40 TABLET ORAL at 17:11

## 2019-11-02 RX ADMIN — Medication 50 MILLIGRAM(S): at 08:49

## 2019-11-02 RX ADMIN — Medication 0.5 MILLIGRAM(S): at 08:49

## 2019-11-03 RX ADMIN — Medication 50 MILLIGRAM(S): at 08:25

## 2019-11-03 RX ADMIN — Medication 0.5 MILLIGRAM(S): at 20:10

## 2019-11-03 RX ADMIN — Medication 0.5 MILLIGRAM(S): at 08:25

## 2019-11-04 VITALS
HEART RATE: 78 BPM | RESPIRATION RATE: 14 BRPM | SYSTOLIC BLOOD PRESSURE: 121 MMHG | TEMPERATURE: 98 F | DIASTOLIC BLOOD PRESSURE: 87 MMHG

## 2019-11-04 PROCEDURE — 99238 HOSP IP/OBS DSCHRG MGMT 30/<: CPT | Mod: 25

## 2019-11-04 PROCEDURE — 90870 ELECTROCONVULSIVE THERAPY: CPT

## 2019-11-04 RX ORDER — LURASIDONE HYDROCHLORIDE 40 MG/1
1 TABLET ORAL
Qty: 0 | Refills: 0 | DISCHARGE

## 2019-11-04 RX ORDER — CLONAZEPAM 1 MG
1 TABLET ORAL
Qty: 28 | Refills: 0
Start: 2019-11-04 | End: 2019-11-17

## 2019-11-04 RX ADMIN — Medication 0.5 MILLIGRAM(S): at 08:08

## 2019-11-04 RX ADMIN — Medication 50 MILLIGRAM(S): at 08:08

## 2019-11-06 ENCOUNTER — OUTPATIENT (OUTPATIENT)
Dept: OUTPATIENT SERVICES | Facility: HOSPITAL | Age: 44
LOS: 1 days | Discharge: HOME | End: 2019-11-06
Payer: COMMERCIAL

## 2019-11-06 DIAGNOSIS — Z98.890 OTHER SPECIFIED POSTPROCEDURAL STATES: Chronic | ICD-10-CM

## 2019-11-06 DIAGNOSIS — Z98.891 HISTORY OF UTERINE SCAR FROM PREVIOUS SURGERY: Chronic | ICD-10-CM

## 2019-11-07 PROCEDURE — 90870 ELECTROCONVULSIVE THERAPY: CPT

## 2019-12-12 DIAGNOSIS — F33.9 MAJOR DEPRESSIVE DISORDER, RECURRENT, UNSPECIFIED: ICD-10-CM

## 2022-05-25 NOTE — PATIENT PROFILE OB - NSCAFFEAMTFREQ_GEN_ALL_CORE_SD
Take Tylenol and ibuprofen as needed for pain.    Ice 15 to 20 minutes at a time every 2-3 hours while awake.    If you are not better in 1 week, follow-up with orthopedic surgery for further evaluation and treatment including possible aspiration or injection of the bursa.   occasional use

## 2022-05-29 NOTE — PATIENT PROFILE OB - BREASTFEEDING PROVIDES STABLE TEMPERATURE THROUGH SKIN TO SKIN CONTACT
Dr Suri Mcclain in to see pt. Ultrasound done to check twin hr. 2 separate heart beats noted, tried to adjust efm to trace.  Due to continue to trace 1 heartbeat Statement Selected

## 2023-12-22 NOTE — ED ADULT NURSE NOTE - NS ED NURSE DISCH DISPOSITION
Admitted
L 3rd, 4th and 5th digit injury s/p mechanical fall last night  denies LOC, head injury or blood thinner use

## 2024-01-24 ENCOUNTER — NON-APPOINTMENT (OUTPATIENT)
Age: 49
End: 2024-01-24

## 2024-01-25 ENCOUNTER — APPOINTMENT (OUTPATIENT)
Dept: ORTHOPEDIC SURGERY | Facility: CLINIC | Age: 49
End: 2024-01-25
Payer: COMMERCIAL

## 2024-01-25 VITALS — BODY MASS INDEX: 19.94 KG/M2 | HEIGHT: 58 IN | WEIGHT: 95 LBS

## 2024-01-25 PROCEDURE — 99203 OFFICE O/P NEW LOW 30 MIN: CPT

## 2024-01-25 PROCEDURE — 72040 X-RAY EXAM NECK SPINE 2-3 VW: CPT

## 2024-01-25 RX ORDER — METHYLPREDNISOLONE 4 MG/1
4 TABLET ORAL
Qty: 1 | Refills: 0 | Status: ACTIVE | COMMUNITY
Start: 2024-01-25 | End: 1900-01-01

## 2024-01-30 RX ORDER — LIDOCAINE 5% 700 MG/1
5 PATCH TOPICAL
Qty: 1 | Refills: 0 | Status: ACTIVE | COMMUNITY
Start: 2024-01-30 | End: 1900-01-01

## 2024-01-31 ENCOUNTER — APPOINTMENT (OUTPATIENT)
Dept: MRI IMAGING | Facility: CLINIC | Age: 49
End: 2024-01-31
Payer: COMMERCIAL

## 2024-01-31 PROCEDURE — 72141 MRI NECK SPINE W/O DYE: CPT

## 2024-02-05 ENCOUNTER — APPOINTMENT (OUTPATIENT)
Dept: ORTHOPEDIC SURGERY | Facility: CLINIC | Age: 49
End: 2024-02-05
Payer: COMMERCIAL

## 2024-02-05 ENCOUNTER — NON-APPOINTMENT (OUTPATIENT)
Age: 49
End: 2024-02-05

## 2024-02-05 VITALS — BODY MASS INDEX: 19.94 KG/M2 | HEIGHT: 58 IN | WEIGHT: 95 LBS

## 2024-02-05 DIAGNOSIS — M54.12 RADICULOPATHY, CERVICAL REGION: ICD-10-CM

## 2024-02-05 DIAGNOSIS — M77.8 OTHER ENTHESOPATHIES, NOT ELSEWHERE CLASSIFIED: ICD-10-CM

## 2024-02-05 PROCEDURE — 99213 OFFICE O/P EST LOW 20 MIN: CPT

## 2024-02-05 RX ORDER — MELOXICAM 15 MG/1
15 TABLET ORAL
Qty: 30 | Refills: 1 | Status: ACTIVE | COMMUNITY
Start: 2024-02-05 | End: 1900-01-01

## 2024-02-05 NOTE — PHYSICAL EXAM
[de-identified] : Neck: Spasm Tender trap, paracervical muscles +spurling  Xrays DDD  R hand:  Mild swelling  Tender 2nd A1 pulley  Decreased index ROM  +index triggering

## 2024-02-05 NOTE — HISTORY OF PRESENT ILLNESS
[de-identified] : R cubital tunnel surgery 10 years ago by me She was doing well until recently   She has neck pain R hand pain/numbness  [6] : 6 [Tingling] : tingling [Ice] : ice [] : yes [FreeTextEntry1] : R hand/ wrist /elbow [FreeTextEntry5] : no recent injury. saw er last night-pinched nerve,torodol,meloxicam has n/t seenbrown 10 yrs ago-sx ulna [de-identified] : ER 1/24/24

## 2024-03-07 ENCOUNTER — APPOINTMENT (OUTPATIENT)
Dept: ORTHOPEDIC SURGERY | Facility: CLINIC | Age: 49
End: 2024-03-07

## 2024-03-13 ENCOUNTER — APPOINTMENT (OUTPATIENT)
Dept: PAIN MANAGEMENT | Facility: CLINIC | Age: 49
End: 2024-03-13

## 2024-09-12 ENCOUNTER — APPOINTMENT (OUTPATIENT)
Dept: OBGYN | Facility: CLINIC | Age: 49
End: 2024-09-12

## 2025-04-17 NOTE — PATIENT PROFILE OB - AS SC BRADEN ACTIVITY
HELIO finalized: 5/27/2025 by LMP, 11wk US and ACOG    Genetic testing (NIPS-Quad)/CF/AFP:  NIPS neg XY, CF/SMA neg    COVID: recommended  Flu: recommended  Tdap: 27-36 weeks  RSV: 32-36 weeks    1hr gtt: not done due to vomiting, wants to do 3hr gtt- passed    Rhogam: Apos  ?Sterilization:    EPDS:     Anatomy US: 1/14 EFW 45%, AC 38%, cephalic, anterior grade 1 placenta, normal anatomy, left renal dilation 6 mm, limited profile  FU US: 2/11 EFW 20%, AC 25%, anterior grade 1 placenta, follow-up anatomy WNL  4/16 EFW 35%, AC 47%, vertex, OTILIA 18, b/l renal pelvis dilation    PROBLEM LIST/PLAN:   Hx of C/S x2- recommend repeat- scheduled for 5/23    Hx of HTN in G1- baseline labs normal    Tobacco use- cessation advised    Syphilis- RPR titer 1:1  >>Penicillin therapy plan placed and completed  >>confirmation testing ordered 01/14/25, positive confirmation testing  >>2/11 RPR negative   (4) walks frequently

## 2025-07-11 NOTE — DISCHARGE NOTE OB - USE THE FOOD PYRAMID (LOCATED IN DISCHARGE MATERIALS PROVIDED) AS A GUIDE TO BALANCE AND MODERATION
tablet  Commonly known as: MEDROL DOSEPACK               Where to Get Your Medications        These medications were sent to [facility] - [place], [place] - [address] - P [phone] - F [phone]  [address] [facility] [address]      Phone: [phone]   albuterol (2.5 MG/3ML) 0.083% nebulizer solution  azithromycin 250 MG tablet  Breztri Aerosphere 160-9-4.8 MCG/ACT Aero  predniSONE 10 MG tablet  sertraline 50 MG tablet         My Recommended  Diet: Regular  Activity: Ad Kiesha using Oxygen 2 l/min NC 24/7  Wound Care: none  Follow-up labs: routine    HOSPITALIST RECOMMENDATIONS  Continued Rx Azithromycin three times weekly - Mon / Wed / Fri  Plan slow Prednisone taper, to continue low dose until follow up with Pulm Med 8/18  Continued Mucinex 600mg 1-2 tabs twice daily  Trial of Albuterol Nebulizer to see if this is less drying the DuoNeb (Ipatropium/Albuterol)  Encourage drinking fluids throughout the day  Planning for Pulmonary Rehab at appointments  DARSHANA JIMENEZ MD   [phone]    ______________________________________________________________________  DISPOSITION:    Home with Family: x   Home with HH/PT/OT/RN:    SNF/LTC:    STEPHANIE:    OTHER:        Condition at Discharge:  Stable  _____________________________________________________________________  Follow up with:   PCP : Suzanne Corey MD  Follow-up Information       Follow up With Specialties Details Why Contact Info    Jarad Monroy MD Pulmonary Disease, Critical Care Medicine Follow up on 8/18/2025 August 18, 2025 1:30PM [facility]  [address]   [address]  [facility] [address] [phone]      Suzanne Corey MD Family Medicine Follow up in 1 week(s)  [address] [facility] [address] [phone]            Total time in minutes spent coordinating this discharge (includes going over instructions, follow-up, prescriptions, and preparing report for sign off to her PCP) :35 
Statement Selected